# Patient Record
Sex: MALE | Race: WHITE | Employment: FULL TIME | URBAN - NONMETROPOLITAN AREA
[De-identification: names, ages, dates, MRNs, and addresses within clinical notes are randomized per-mention and may not be internally consistent; named-entity substitution may affect disease eponyms.]

---

## 2019-01-17 LAB
CHOLESTEROL, TOTAL: NORMAL
CHOLESTEROL/HDL RATIO: NORMAL
HDLC SERPL-MCNC: NORMAL MG/DL
LDL CHOLESTEROL CALCULATED: NORMAL
TRIGL SERPL-MCNC: NORMAL MG/DL
VLDLC SERPL CALC-MCNC: NORMAL MG/DL

## 2019-12-18 RX ORDER — TRIAMCINOLONE ACETONIDE 1 MG/G
CREAM TOPICAL DAILY
COMMUNITY
End: 2020-01-17

## 2020-01-17 ENCOUNTER — OFFICE VISIT (OUTPATIENT)
Dept: FAMILY MEDICINE CLINIC | Age: 65
End: 2020-01-17
Payer: COMMERCIAL

## 2020-01-17 ENCOUNTER — HOSPITAL ENCOUNTER (OUTPATIENT)
Age: 65
Discharge: HOME | End: 2020-01-19
Payer: COMMERCIAL

## 2020-01-17 VITALS
SYSTOLIC BLOOD PRESSURE: 138 MMHG | TEMPERATURE: 97.5 F | OXYGEN SATURATION: 99 % | DIASTOLIC BLOOD PRESSURE: 88 MMHG | HEART RATE: 63 BPM | WEIGHT: 206.2 LBS | BODY MASS INDEX: 26.46 KG/M2 | HEIGHT: 74 IN

## 2020-01-17 LAB
ALBUMIN SERPL-MCNC: 4.5 G/DL (ref 3.5–5.2)
ALP BLD-CCNC: 39 U/L (ref 40–129)
ALT SERPL-CCNC: 14 U/L (ref 0–40)
ANION GAP SERPL CALCULATED.3IONS-SCNC: 15 MMOL/L (ref 7–16)
AST SERPL-CCNC: 18 U/L (ref 0–39)
BASOPHILS ABSOLUTE: 0.03 E9/L (ref 0–0.2)
BASOPHILS RELATIVE PERCENT: 0.5 % (ref 0–2)
BILIRUB SERPL-MCNC: 0.4 MG/DL (ref 0–1.2)
BUN BLDV-MCNC: 12 MG/DL (ref 8–23)
CALCIUM SERPL-MCNC: 9.4 MG/DL (ref 8.6–10.2)
CHLORIDE BLD-SCNC: 103 MMOL/L (ref 98–107)
CHOLESTEROL, TOTAL: 171 MG/DL (ref 0–199)
CO2: 24 MMOL/L (ref 22–29)
CREAT SERPL-MCNC: 1.3 MG/DL (ref 0.7–1.2)
EOSINOPHILS ABSOLUTE: 0.27 E9/L (ref 0.05–0.5)
EOSINOPHILS RELATIVE PERCENT: 4.5 % (ref 0–6)
GFR AFRICAN AMERICAN: >60
GFR NON-AFRICAN AMERICAN: 56 ML/MIN/1.73
GLUCOSE BLD-MCNC: 94 MG/DL (ref 74–99)
HCT VFR BLD CALC: 46.2 % (ref 37–54)
HDLC SERPL-MCNC: 71 MG/DL
HEMOGLOBIN: 14.6 G/DL (ref 12.5–16.5)
IMMATURE GRANULOCYTES #: 0.01 E9/L
IMMATURE GRANULOCYTES %: 0.2 % (ref 0–5)
LDL CHOLESTEROL CALCULATED: 83 MG/DL (ref 0–99)
LYMPHOCYTES ABSOLUTE: 1.61 E9/L (ref 1.5–4)
LYMPHOCYTES RELATIVE PERCENT: 26.7 % (ref 20–42)
MCH RBC QN AUTO: 28.9 PG (ref 26–35)
MCHC RBC AUTO-ENTMCNC: 31.6 % (ref 32–34.5)
MCV RBC AUTO: 91.5 FL (ref 80–99.9)
MONOCYTES ABSOLUTE: 0.53 E9/L (ref 0.1–0.95)
MONOCYTES RELATIVE PERCENT: 8.8 % (ref 2–12)
NEUTROPHILS ABSOLUTE: 3.59 E9/L (ref 1.8–7.3)
NEUTROPHILS RELATIVE PERCENT: 59.3 % (ref 43–80)
PDW BLD-RTO: 12.8 FL (ref 11.5–15)
PLATELET # BLD: 208 E9/L (ref 130–450)
PMV BLD AUTO: 10.7 FL (ref 7–12)
POTASSIUM SERPL-SCNC: 4.9 MMOL/L (ref 3.5–5)
PROSTATE SPECIFIC ANTIGEN: 0.73 NG/ML (ref 0–4)
RBC # BLD: 5.05 E12/L (ref 3.8–5.8)
SODIUM BLD-SCNC: 142 MMOL/L (ref 132–146)
TOTAL PROTEIN: 7.1 G/DL (ref 6.4–8.3)
TRIGL SERPL-MCNC: 84 MG/DL (ref 0–149)
TSH SERPL DL<=0.05 MIU/L-ACNC: 5.42 UIU/ML (ref 0.27–4.2)
VLDLC SERPL CALC-MCNC: 17 MG/DL
WBC # BLD: 6 E9/L (ref 4.5–11.5)

## 2020-01-17 PROCEDURE — 80061 LIPID PANEL: CPT

## 2020-01-17 PROCEDURE — 85025 COMPLETE CBC W/AUTO DIFF WBC: CPT

## 2020-01-17 PROCEDURE — 93000 ELECTROCARDIOGRAM COMPLETE: CPT | Performed by: FAMILY MEDICINE

## 2020-01-17 PROCEDURE — G8484 FLU IMMUNIZE NO ADMIN: HCPCS | Performed by: FAMILY MEDICINE

## 2020-01-17 PROCEDURE — 84443 ASSAY THYROID STIM HORMONE: CPT

## 2020-01-17 PROCEDURE — 99396 PREV VISIT EST AGE 40-64: CPT | Performed by: FAMILY MEDICINE

## 2020-01-17 PROCEDURE — 80053 COMPREHEN METABOLIC PANEL: CPT

## 2020-01-17 PROCEDURE — G0103 PSA SCREENING: HCPCS

## 2020-01-17 PROCEDURE — 36415 COLL VENOUS BLD VENIPUNCTURE: CPT

## 2020-01-17 RX ORDER — AMLODIPINE BESYLATE 5 MG/1
5 TABLET ORAL DAILY
Qty: 90 TABLET | Refills: 1 | Status: SHIPPED
Start: 2020-01-17 | End: 2021-04-13

## 2020-01-17 RX ORDER — CLOTRIMAZOLE AND BETAMETHASONE DIPROPIONATE 10; .64 MG/G; MG/G
CREAM TOPICAL
Qty: 1 TUBE | Refills: 2 | Status: SHIPPED
Start: 2020-01-17 | End: 2021-04-13

## 2020-01-17 SDOH — HEALTH STABILITY: MENTAL HEALTH: HOW MANY STANDARD DRINKS CONTAINING ALCOHOL DO YOU HAVE ON A TYPICAL DAY?: 1 OR 2

## 2020-01-17 SDOH — HEALTH STABILITY: MENTAL HEALTH: HOW OFTEN DO YOU HAVE A DRINK CONTAINING ALCOHOL?: 2-3 TIMES A WEEK

## 2020-01-17 ASSESSMENT — ENCOUNTER SYMPTOMS
DIARRHEA: 0
TROUBLE SWALLOWING: 0
ABDOMINAL PAIN: 0
BLOOD IN STOOL: 0
SHORTNESS OF BREATH: 0
PHOTOPHOBIA: 0
EYE REDNESS: 0
COUGH: 0
SORE THROAT: 0
BACK PAIN: 0
CONSTIPATION: 0
WHEEZING: 0
VOMITING: 0
SINUS PAIN: 0

## 2020-01-17 ASSESSMENT — PATIENT HEALTH QUESTIONNAIRE - PHQ9
SUM OF ALL RESPONSES TO PHQ QUESTIONS 1-9: 0
SUM OF ALL RESPONSES TO PHQ QUESTIONS 1-9: 0
2. FEELING DOWN, DEPRESSED OR HOPELESS: 0
1. LITTLE INTEREST OR PLEASURE IN DOING THINGS: 0
SUM OF ALL RESPONSES TO PHQ9 QUESTIONS 1 & 2: 0

## 2020-01-17 NOTE — PROGRESS NOTES
(NORVASC) 5 MG tablet; Take 1 tablet by mouth daily  -     CBC Auto Differential; Future  -     Comprehensive Metabolic Panel; Future  -     EKG 12 Lead; Future  -     EKG 12 Lead. Changing jobs. BP borderline often high when checked. Will start low dose amlodipine    Screening for prostate cancer  -     PSA SCREENING; Future. Vague pain in area of prostrate intermittently. If PSA up would consider prostatitis    Screen for colon cancer  -     POCT Fit Test; Future  Will consider colonoscopy    Encounter for wellness examination in adult  -     CBC Auto Differential; Future  -     Comprehensive Metabolic Panel; Future  -     Lipid Panel; Future  -     TSH without Reflex; Future  -     PSA SCREENING; Future  -     POCT Fit Test; Future  -     EKG 12 Lead; Future  -     EKG 12 Lead    Overall healthy. Smokes occasional cigar and cannibis     Return in about 1 year (around 1/17/2021). Electronically signed by Luis Stoddard MD on 1/17/20 at 8:15 AM    I have personally reviewed and updated the chief complaint, HPI, Past Medical, Family and Social History, as well as the above Review of Systems.

## 2020-02-11 ENCOUNTER — TELEPHONE (OUTPATIENT)
Dept: FAMILY MEDICINE CLINIC | Age: 65
End: 2020-02-11

## 2020-12-21 ENCOUNTER — HOSPITAL ENCOUNTER (OUTPATIENT)
Dept: CARDIAC CATH/INVASIVE PROCEDURES | Age: 65
Discharge: HOME | End: 2020-12-21
Payer: COMMERCIAL

## 2020-12-21 ENCOUNTER — HOSPITAL ENCOUNTER (OUTPATIENT)
Age: 65
Discharge: HOME | End: 2020-12-21
Payer: COMMERCIAL

## 2020-12-21 VITALS
DIASTOLIC BLOOD PRESSURE: 74 MMHG | WEIGHT: 193 LBS | HEART RATE: 78 BPM | TEMPERATURE: 96.9 F | SYSTOLIC BLOOD PRESSURE: 102 MMHG | BODY MASS INDEX: 24.77 KG/M2 | RESPIRATION RATE: 18 BRPM | HEIGHT: 74 IN

## 2020-12-21 LAB
ABO/RH: NORMAL
ANTIBODY SCREEN: NORMAL

## 2020-12-21 PROCEDURE — 36415 COLL VENOUS BLD VENIPUNCTURE: CPT

## 2020-12-21 PROCEDURE — 2709999900 HC NON-CHARGEABLE SUPPLY

## 2020-12-21 PROCEDURE — 93458 L HRT ARTERY/VENTRICLE ANGIO: CPT

## 2020-12-21 PROCEDURE — C1769 GUIDE WIRE: HCPCS

## 2020-12-21 PROCEDURE — 86901 BLOOD TYPING SEROLOGIC RH(D): CPT

## 2020-12-21 PROCEDURE — C1894 INTRO/SHEATH, NON-LASER: HCPCS

## 2020-12-21 PROCEDURE — 6360000002 HC RX W HCPCS

## 2020-12-21 PROCEDURE — 2500000003 HC RX 250 WO HCPCS

## 2020-12-21 PROCEDURE — 93458 L HRT ARTERY/VENTRICLE ANGIO: CPT | Performed by: INTERNAL MEDICINE

## 2020-12-21 PROCEDURE — 86900 BLOOD TYPING SEROLOGIC ABO: CPT

## 2020-12-21 PROCEDURE — C1887 CATHETER, GUIDING: HCPCS

## 2020-12-21 PROCEDURE — 86850 RBC ANTIBODY SCREEN: CPT

## 2020-12-21 RX ORDER — ACETAMINOPHEN 325 MG/1
650 TABLET ORAL EVERY 4 HOURS PRN
Status: CANCELLED | OUTPATIENT
Start: 2020-12-21

## 2020-12-21 RX ORDER — SODIUM CHLORIDE 9 MG/ML
INJECTION, SOLUTION INTRAVENOUS ONCE
Status: DISCONTINUED | OUTPATIENT
Start: 2020-12-21 | End: 2020-12-22 | Stop reason: HOSPADM

## 2020-12-21 RX ORDER — SODIUM CHLORIDE 0.9 % (FLUSH) 0.9 %
10 SYRINGE (ML) INJECTION PRN
Status: DISCONTINUED | OUTPATIENT
Start: 2020-12-21 | End: 2020-12-22 | Stop reason: HOSPADM

## 2020-12-21 RX ORDER — SODIUM CHLORIDE 0.9 % (FLUSH) 0.9 %
10 SYRINGE (ML) INJECTION PRN
Status: CANCELLED | OUTPATIENT
Start: 2020-12-21

## 2020-12-21 RX ORDER — ACETAMINOPHEN 325 MG/1
650 TABLET ORAL EVERY 4 HOURS PRN
Status: DISCONTINUED | OUTPATIENT
Start: 2020-12-21 | End: 2020-12-22 | Stop reason: HOSPADM

## 2020-12-21 RX ORDER — SODIUM CHLORIDE 0.9 % (FLUSH) 0.9 %
10 SYRINGE (ML) INJECTION EVERY 12 HOURS SCHEDULED
Status: DISCONTINUED | OUTPATIENT
Start: 2020-12-21 | End: 2020-12-22 | Stop reason: HOSPADM

## 2020-12-21 RX ORDER — SODIUM CHLORIDE 0.9 % (FLUSH) 0.9 %
10 SYRINGE (ML) INJECTION EVERY 12 HOURS SCHEDULED
Status: CANCELLED | OUTPATIENT
Start: 2020-12-21

## 2020-12-21 NOTE — PROGRESS NOTES
Patient being transferred from [facility] for heart catheterization in setting of acute systolic heart failure with LVEF 10-15%, 3+ MR.   Currently on guideline directed medical therapy including beta-blocker, ACE inhibitor, diuretic, Aldactone and a LifeVest.  If no significant CAD, okay for discharge and office will arrange follow-up appointment

## 2020-12-21 NOTE — PROCEDURES
[address]                  [address] [place],  [facility]                            CARDIAC CATHETERIZATION    PATIENT NAME: Carlita Stern                  :        1955  MED REC NO:   [id number]                            ROOM:  ACCOUNT NO:   [de-identified]                           ADMIT DATE: 2020  PROVIDER:     Yesenia Carlson MD    DATE OF PROCEDURE:  2020    LEFT HEART CATHETERIZATION    REFERRING PROVIDER:  Sharon Shea MD    INDICATION:  Very severe systolic dysfunction with an ejection fraction  of 10-15%, rule out significant CAD. PROCEDURE:  [facility]  undergo left heart catheterization. He was brought to the cardiac cath  lab in his usual fasting state. Under sterile condition and under local  anesthetic and using conscious sedation, a 6-Trinidadian Terumo Slender  sheath was inserted into the right radial artery. The patient received  5000 of intravenous heparin. He also received 200 mcg of intraarterial  nitroglycerin via the right radial sheath. Then a 5-Trinidadian TIG  diagnostic catheter was advanced to the ascending aorta without  difficulty. The left main coronary artery was engaged and a coronary  angiogram was done. Then the right coronary artery was engaged and a  coronary angiogram was done. This catheter was exchanged over a  guidewire to a 5-Trinidadian pigtail, which was advanced into the left  ventricle without difficulty. The left ventricular end-diastolic  pressure was measured. No left ventriculogram was done due to known  ejection fraction by echocardiogram.  There was no significant gradient  across the aortic valve. At the end of the procedure, pigtail was  pulled out. The Terumo Slender sheath was pulled out and a Vasc Band  was applied over the right radial artery with good hemostasis.   The patient tolerated procedure very well and no complications were noted. No significant blood loss occurred during the procedure. FINDINGS:    HEMODYNAMIC RESULTS:  1. Aortic pressure 99/69 mmHg. 2.  Left ventricular end-diastolic pressure 14 mmHg. CORONARY ANATOMY:  LEFT MAIN:  It is a long and large artery with 20% discrete distal left  main stenosis. LAD:  It is a large artery wrapping around the apex and giving rise to  three diagonal branches and two septal perforators. There was 30%  discrete mid LAD stenosis at the takeoff of the second diagonal branch. LEFT CIRCUMFLEX:  It is a large artery giving rise to three obtuse  marginal branches. The first and third obtuse marginal branches were  small. The second obtuse marginal branch was large. There was 30%  ostial left circumflex stenosis. RIGHT CORONARY ARTERY:  It is a large dominant artery giving rise to a  conus branch, two right ventricular marginal branches, a bifurcating PDA  and a PLV branch. 10% irregularities were noted in the mid RCA. IMPRESSION:  1. Mild nonobstructive CAD. 2.  LVEDP 14 mmHg. RECOMMENDATIONS:  1. Aggressive medical therapy. 2.  Guideline-directed medical therapy. PROCEDURE START TIME:  1450 hours. PROCEDURE END TIME:  1507 hours. CONTRAST VOLUME:  35 mL of Optiray. FLUOROSCOPY TIME:  1.7 minutes. CONSCIOUS SEDATION:  1 mg of intravenous Versed and 50 mcg of  intravenous fentanyl.         Nelson Brandon MD    D: 12/21/2020 15:20:48       T: 12/21/2020 15:24:56     [id number]  Job#: [id number]     Doc#: [id number]    CC:

## 2020-12-31 ENCOUNTER — TELEPHONE (OUTPATIENT)
Dept: ADMINISTRATIVE | Age: 65
End: 2020-12-31

## 2020-12-31 NOTE — TELEPHONE ENCOUNTER
Tima Evans was D/Cd from [facility] on 12/20, had cardiac testing done in [facility] on 12/21, and needs a hospital follow up. The next available hosp f/u is 01/13. He said that he is following up with his cardiologist on 1/6. Please advise when and where to add pt to schedule.   Thanks

## 2021-01-11 ENCOUNTER — OFFICE VISIT (OUTPATIENT)
Dept: FAMILY MEDICINE CLINIC | Age: 66
End: 2021-01-11
Payer: COMMERCIAL

## 2021-01-11 VITALS
HEART RATE: 63 BPM | HEIGHT: 74 IN | SYSTOLIC BLOOD PRESSURE: 126 MMHG | WEIGHT: 201 LBS | DIASTOLIC BLOOD PRESSURE: 72 MMHG | OXYGEN SATURATION: 98 % | BODY MASS INDEX: 25.8 KG/M2

## 2021-01-11 DIAGNOSIS — I42.0 DILATED CARDIOMYOPATHY (HCC): ICD-10-CM

## 2021-01-11 DIAGNOSIS — I34.0 NONRHEUMATIC MITRAL VALVE REGURGITATION: ICD-10-CM

## 2021-01-11 DIAGNOSIS — I50.32 CHRONIC CONGESTIVE HEART FAILURE WITH LEFT VENTRICULAR DIASTOLIC DYSFUNCTION (HCC): Primary | ICD-10-CM

## 2021-01-11 PROBLEM — I50.30 CONGESTIVE HEART FAILURE WITH LEFT VENTRICULAR DIASTOLIC DYSFUNCTION (HCC): Status: ACTIVE | Noted: 2021-01-11

## 2021-01-11 LAB
CHP ED QC CHECK: NORMAL
GLUCOSE BLD-MCNC: 92 MG/DL
HBA1C MFR BLD: 5.3 %

## 2021-01-11 PROCEDURE — 4004F PT TOBACCO SCREEN RCVD TLK: CPT | Performed by: FAMILY MEDICINE

## 2021-01-11 PROCEDURE — G8484 FLU IMMUNIZE NO ADMIN: HCPCS | Performed by: FAMILY MEDICINE

## 2021-01-11 PROCEDURE — G8419 CALC BMI OUT NRM PARAM NOF/U: HCPCS | Performed by: FAMILY MEDICINE

## 2021-01-11 PROCEDURE — G8427 DOCREV CUR MEDS BY ELIG CLIN: HCPCS | Performed by: FAMILY MEDICINE

## 2021-01-11 PROCEDURE — 82962 GLUCOSE BLOOD TEST: CPT | Performed by: FAMILY MEDICINE

## 2021-01-11 PROCEDURE — 1111F DSCHRG MED/CURRENT MED MERGE: CPT | Performed by: FAMILY MEDICINE

## 2021-01-11 PROCEDURE — 99214 OFFICE O/P EST MOD 30 MIN: CPT | Performed by: FAMILY MEDICINE

## 2021-01-11 PROCEDURE — 83036 HEMOGLOBIN GLYCOSYLATED A1C: CPT | Performed by: FAMILY MEDICINE

## 2021-01-11 PROCEDURE — 3017F COLORECTAL CA SCREEN DOC REV: CPT | Performed by: FAMILY MEDICINE

## 2021-01-11 RX ORDER — SPIRONOLACTONE 25 MG/1
25 TABLET ORAL DAILY
COMMUNITY
Start: 2021-01-02

## 2021-01-11 RX ORDER — CARVEDILOL 12.5 MG/1
TABLET ORAL
COMMUNITY
Start: 2020-12-26

## 2021-01-11 RX ORDER — BUMETANIDE 1 MG/1
1 TABLET ORAL DAILY
COMMUNITY

## 2021-01-11 ASSESSMENT — ENCOUNTER SYMPTOMS
CONSTIPATION: 0
WHEEZING: 0
BLOOD IN STOOL: 0
PHOTOPHOBIA: 0
ABDOMINAL PAIN: 0
SHORTNESS OF BREATH: 1
DIARRHEA: 0
BACK PAIN: 0
VOMITING: 0
COUGH: 0
SORE THROAT: 0
EYE REDNESS: 0
SINUS PAIN: 0
TROUBLE SWALLOWING: 0

## 2021-01-11 NOTE — PROGRESS NOTES
OFFICE NOTE    1/11/21  Name: Brett Mccormick  JYW:[birth year]   Sex:male   Age:65 y.o. SUBJECTIVE  Chief Complaint   Patient presents with    Follow-Up from Hospital       HPI  Transition to care. Was admitted to hospital in congestive heart failure. Full records not in but had significant pleural effusion and EF 15-20% on ECHO. Was sent home with f/u scheduled with Dr. Aries Macias and with defibrillator vest. At Cardiology f/u was advised to have ECHO done in couple of mos and f/u in May. Was advised to wear vest until seen. No instructions on return to work. Did not take meds when left hospital for 5 days as said he had no prescription or written instructions. Brought in [place] samples. And told he was told to stop Enalopril and replace with Entresto but hadn't filled or started the latter. Denies chest pain, syncope. Says able to sleep in bed, no defibrillator discharges. 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 Review of Systems   Constitutional: Positive for fatigue. Negative for appetite change, fever and unexpected weight change. HENT: Negative for congestion, ear pain, hearing loss, sinus pain, sore throat and trouble swallowing. Eyes: Negative for photophobia, redness and visual disturbance. Respiratory: Positive for shortness of breath. Negative for cough and wheezing. ORDONEZ has improved substantially   Cardiovascular: Positive for palpitations. Negative for chest pain and leg swelling. Dependent edema has resolved   Gastrointestinal: Negative for abdominal pain, blood in stool, constipation, diarrhea and vomiting. Endocrine: Negative for cold intolerance, polydipsia and polyuria. Genitourinary: Negative for difficulty urinating, genital sores, hematuria and urgency. Musculoskeletal: Negative for arthralgias, back pain and joint swelling. Allergic/Immunologic: Negative for food allergies. Neurological: Negative for dizziness, tremors, seizures, syncope, numbness and headaches. Hematological: Negative for adenopathy. Does not bruise/bleed easily. Psychiatric/Behavioral: Negative for agitation, dysphoric mood, hallucinations and sleep disturbance. All other systems reviewed and are negative. Current Outpatient Medications:     carvedilol (COREG) 12.5 MG tablet, take 1 tablet by mouth twice a day, Disp: , Rfl:     spironolactone (ALDACTONE) 25 MG tablet, , Disp: , Rfl:     bumetanide (BUMEX) 1 MG tablet, Take 1 mg by mouth daily, Disp: , Rfl:     amLODIPine (NORVASC) 5 MG tablet, Take 1 tablet by mouth daily, Disp: 90 tablet, Rfl: 1    clotrimazole-betamethasone (LOTRISONE) 1-0.05 % cream, Apply topically 2 times daily. (Patient not taking: Reported on 1/11/2021), Disp: 1 Tube, Rfl: 2  No Known Allergies    Past Medical History:   Diagnosis Date    Hypertension      Past Surgical History:   Procedure Laterality Date    CARDIAC CATHETERIZATION  12/21/2020    coronaries with lv pressures     No family history on file. Social History     Tobacco History     Smoking Status  Current Some Day Smoker Smoking Frequency  0 packs/day for 42 years (0 pk yrs) Smoking Tobacco Type  Cigars    Smokeless Tobacco Use  Never Used          Alcohol History     Alcohol Use Status  Yes          Drug Use     Drug Use Status  Never          Sexual Activity     Sexually Active  Not Currently Partners  Female                OBJECTIVE  Vitals:    01/11/21 1250   BP: 126/72   Pulse: 63   SpO2: 98%   Weight: 201 lb (91.2 kg)   Height: 6' 2\" (1.88 m)        Body mass index is 25.81 kg/m². Orders Placed This Encounter   Procedures    XR CHEST (2 VW)     Standing Status:   Future     Number of Occurrences:   1     Standing Expiration Date:   1/11/2022    POCT glycosylated hemoglobin (Hb A1C)    POCT Glucose        EXAM   Physical Exam  Vitals signs and nursing note reviewed. Constitutional:       Appearance: Normal appearance. He is well-developed and normal weight.    HENT: Diagnoses and all orders for this visit:    Chronic congestive heart failure with left ventricular diastolic dysfunction (HCC)  -     XR CHEST (2 VW); Future  -     POCT glycosylated hemoglobin (Hb A1C)  -     POCT Glucose  Hgb a1C and glucose both normal. Does not need Farxiga  Dilated cardiomyopathy ([address]  -     POCT glycosylated hemoglobin (Hb A1C)  -     POCT Glucose  Advised us cath was normal. Filled out paperwork to get OV notes and cath report from Dr. Nuvia Tran office    Nonrheumatic mitral valve regurgitation  -     POCT glycosylated hemoglobin (Hb A1C)  -     POCT Glucose    Agree f/u ECHO on meds indicated. Would like to see OV notes to see if really supposed to wear vest and not f/u until May. Would not drive truck at this time until status clarified. Would resume Enalopril until [facility] [place] started    Return in 1 mos  No follow-ups on file.     Electronically signed by Humberto Nolasco MD on 1/11/21 at 1:15 PM EST

## 2021-03-01 LAB
LEFT VENTRICULAR EJECTION FRACTION HIGH VALUE: 15 %
LV EF: 10 %

## 2021-04-13 ENCOUNTER — OFFICE VISIT (OUTPATIENT)
Dept: NON INVASIVE DIAGNOSTICS | Age: 66
End: 2021-04-13
Payer: COMMERCIAL

## 2021-04-13 VITALS
SYSTOLIC BLOOD PRESSURE: 112 MMHG | BODY MASS INDEX: 25.67 KG/M2 | WEIGHT: 200 LBS | RESPIRATION RATE: 16 BRPM | HEART RATE: 53 BPM | DIASTOLIC BLOOD PRESSURE: 78 MMHG | HEIGHT: 74 IN

## 2021-04-13 DIAGNOSIS — I48.91 ATRIAL FIBRILLATION, UNSPECIFIED TYPE (HCC): Primary | ICD-10-CM

## 2021-04-13 PROCEDURE — 93000 ELECTROCARDIOGRAM COMPLETE: CPT | Performed by: SPECIALIST

## 2021-04-13 PROCEDURE — 99205 OFFICE O/P NEW HI 60 MIN: CPT | Performed by: SPECIALIST

## 2021-04-13 RX ORDER — SACUBITRIL AND VALSARTAN 24; 26 MG/1; MG/1
TABLET, FILM COATED ORAL
COMMUNITY
Start: 2021-03-25

## 2021-04-13 NOTE — PROGRESS NOTES
Sarah Russo paid his first visit to the [facility]) Cardiac Electrophysiology office on 4/13/21. SYNOPSIS  1. Poor metabolic health incurring multiple comorbid conditions. 2. Heart failure with reduced ejection fraction: initial presentation as decompensated in late 2020. By report, left ventricular systolic function was severely diminished, mitral regurgitation significant (not severe), and coronary angiography was unremarkable. Maximally tolerated medicinal therapy was introduced, and he was fitted with a wearable cardioverter defibrillator system (below) as a \"primary prevention\" gesture. A repeat echocardiogram in March demonstrated persistently/severely diminished left ventricular systolic function. 3. Sinus node somnolence. 4. Atrioventricular conduction is normal.  5. Intraventricular conduction is normal.  6. Wearable cardioverter-defibrillator: normal interim function. MY PLAN  1. Implant dual-chamber cardioverter-defibrillator system as a \"primary prevention\" gesture to replace the wearable cardioverter-defibrillator. I provided him with educational materials pertinent to his condition and to ICD therapy. Included among the latter is a shared decision making document, which he will review prior to making a final decision. I will keep you posted. Yari Villa MD, [facility]      A total of 60 minutes were spent in preparation for the clinic visit, reviewing records/tests, counseling/education of the patient, ordering medications/tests/procedures, coordinating care, and documenting clinical information in the EHR.

## 2021-04-13 NOTE — LETTER
Manuel Metz paid his first visit to the [facility]) Cardiac Electrophysiology office on 4/13/21. SYNOPSIS  1. Poor metabolic health incurring multiple comorbid conditions. 2. Heart failure with reduced ejection fraction: initial presentation as decompensated in late 2020. By report, left ventricular systolic function was severely diminished, mitral regurgitation significant (not severe), and coronary angiography was unremarkable. Maximally tolerated medicinal therapy was introduced, and he was fitted with a wearable cardioverter defibrillator system (below) as a \"primary prevention\" gesture. A repeat echocardiogram in March demonstrated persistently/severely diminished left ventricular systolic function. 3. Sinus node somnolence. 4. Atrioventricular conduction is normal.  5. Intraventricular conduction is normal.  6. Wearable cardioverter-defibrillator: normal interim function. MY PLAN  1. Implant dual-chamber cardioverter-defibrillator system as a \"primary prevention\" gesture to replace the wearable cardioverter-defibrillator. I provided him with educational materials pertinent to his condition and to ICD therapy. Included among the latter is a shared decision making document, which he will review prior to making a final decision. I will keep you posted.      Philip Dance, MD, [facility]

## 2021-04-16 ENCOUNTER — TELEPHONE (OUTPATIENT)
Dept: NON INVASIVE DIAGNOSTICS | Age: 66
End: 2021-04-16

## 2021-04-16 DIAGNOSIS — Z01.818 PRE-OP TESTING: Primary | ICD-10-CM

## 2021-04-26 ENCOUNTER — HOSPITAL ENCOUNTER (OUTPATIENT)
Age: 66
Discharge: HOME | End: 2021-04-28
Payer: COMMERCIAL

## 2021-04-26 DIAGNOSIS — Z01.818 PRE-OP TESTING: ICD-10-CM

## 2021-04-26 PROCEDURE — U0003 INFECTIOUS AGENT DETECTION BY NUCLEIC ACID (DNA OR RNA); SEVERE ACUTE RESPIRATORY SYNDROME CORONAVIRUS 2 (SARS-COV-2) (CORONAVIRUS DISEASE [COVID-19]), AMPLIFIED PROBE TECHNIQUE, MAKING USE OF HIGH THROUGHPUT TECHNOLOGIES AS DESCRIBED BY CMS-2020-01-R: HCPCS

## 2021-04-26 PROCEDURE — U0005 INFEC AGEN DETEC AMPLI PROBE: HCPCS

## 2021-04-27 LAB
SARS-COV-2: NOT DETECTED
SOURCE: NORMAL

## 2021-04-29 ENCOUNTER — TELEPHONE (OUTPATIENT)
Dept: CARDIAC CATH/INVASIVE PROCEDURES | Age: 66
End: 2021-04-29

## 2021-04-29 NOTE — TELEPHONE ENCOUNTER
Reminded patient of scheduled procedure on 4/30/21. Instructions given and COVID questionnaire completed.

## 2021-04-30 ENCOUNTER — TELEPHONE (OUTPATIENT)
Dept: NON INVASIVE DIAGNOSTICS | Age: 66
End: 2021-04-30

## 2021-04-30 ENCOUNTER — HOSPITAL ENCOUNTER (OUTPATIENT)
Dept: GENERAL RADIOLOGY | Age: 66
Discharge: HOME | End: 2021-05-02
Payer: COMMERCIAL

## 2021-04-30 ENCOUNTER — ANESTHESIA (OUTPATIENT)
Dept: CARDIAC CATH/INVASIVE PROCEDURES | Age: 66
End: 2021-04-30

## 2021-04-30 ENCOUNTER — ANESTHESIA EVENT (OUTPATIENT)
Dept: CARDIAC CATH/INVASIVE PROCEDURES | Age: 66
End: 2021-04-30

## 2021-04-30 ENCOUNTER — HOSPITAL ENCOUNTER (OUTPATIENT)
Dept: CARDIAC CATH/INVASIVE PROCEDURES | Age: 66
Discharge: HOME | End: 2021-04-30
Payer: COMMERCIAL

## 2021-04-30 VITALS
OXYGEN SATURATION: 99 % | BODY MASS INDEX: 25.03 KG/M2 | SYSTOLIC BLOOD PRESSURE: 119 MMHG | HEART RATE: 50 BPM | RESPIRATION RATE: 20 BRPM | DIASTOLIC BLOOD PRESSURE: 70 MMHG | HEIGHT: 74 IN | TEMPERATURE: 97.5 F | WEIGHT: 195 LBS

## 2021-04-30 VITALS — OXYGEN SATURATION: 98 % | SYSTOLIC BLOOD PRESSURE: 105 MMHG | DIASTOLIC BLOOD PRESSURE: 66 MMHG

## 2021-04-30 LAB
ANION GAP SERPL CALCULATED.3IONS-SCNC: 7 MMOL/L (ref 7–16)
BASOPHILS ABSOLUTE: 0.05 E9/L (ref 0–0.2)
BASOPHILS RELATIVE PERCENT: 0.5 % (ref 0–2)
BUN BLDV-MCNC: 17 MG/DL (ref 6–23)
CALCIUM SERPL-MCNC: 9 MG/DL (ref 8.6–10.2)
CHLORIDE BLD-SCNC: 103 MMOL/L (ref 98–107)
CO2: 30 MMOL/L (ref 22–29)
CREAT SERPL-MCNC: 1.2 MG/DL (ref 0.7–1.2)
EKG ATRIAL RATE: 57 BPM
EKG ATRIAL RATE: 78 BPM
EKG P AXIS: -9 DEGREES
EKG P-R INTERVAL: 146 MS
EKG P-R INTERVAL: 202 MS
EKG Q-T INTERVAL: 412 MS
EKG Q-T INTERVAL: 422 MS
EKG QRS DURATION: 88 MS
EKG QRS DURATION: 90 MS
EKG QTC CALCULATION (BAZETT): 410 MS
EKG QTC CALCULATION (BAZETT): 469 MS
EKG R AXIS: -2 DEGREES
EKG R AXIS: 12 DEGREES
EKG T AXIS: 118 DEGREES
EKG T AXIS: 138 DEGREES
EKG VENTRICULAR RATE: 57 BPM
EKG VENTRICULAR RATE: 78 BPM
EOSINOPHILS ABSOLUTE: 0.39 E9/L (ref 0.05–0.5)
EOSINOPHILS RELATIVE PERCENT: 4.2 % (ref 0–6)
GFR AFRICAN AMERICAN: >60
GFR NON-AFRICAN AMERICAN: >60 ML/MIN/1.73
GLUCOSE BLD-MCNC: 94 MG/DL (ref 74–99)
HCT VFR BLD CALC: 42.2 % (ref 37–54)
HEMOGLOBIN: 14.4 G/DL (ref 12.5–16.5)
IMMATURE GRANULOCYTES #: 0.04 E9/L
IMMATURE GRANULOCYTES %: 0.4 % (ref 0–5)
LYMPHOCYTES ABSOLUTE: 2.46 E9/L (ref 1.5–4)
LYMPHOCYTES RELATIVE PERCENT: 26.5 % (ref 20–42)
MAGNESIUM: 2.3 MG/DL (ref 1.6–2.6)
MCH RBC QN AUTO: 30.7 PG (ref 26–35)
MCHC RBC AUTO-ENTMCNC: 34.1 % (ref 32–34.5)
MCV RBC AUTO: 90 FL (ref 80–99.9)
MONOCYTES ABSOLUTE: 0.96 E9/L (ref 0.1–0.95)
MONOCYTES RELATIVE PERCENT: 10.3 % (ref 2–12)
NEUTROPHILS ABSOLUTE: 5.4 E9/L (ref 1.8–7.3)
NEUTROPHILS RELATIVE PERCENT: 58.1 % (ref 43–80)
PDW BLD-RTO: 12.2 FL (ref 11.5–15)
PLATELET # BLD: 237 E9/L (ref 130–450)
PMV BLD AUTO: 9.2 FL (ref 7–12)
POTASSIUM SERPL-SCNC: 4.2 MMOL/L (ref 3.5–5)
RBC # BLD: 4.69 E12/L (ref 3.8–5.8)
SODIUM BLD-SCNC: 140 MMOL/L (ref 132–146)
WBC # BLD: 9.3 E9/L (ref 4.5–11.5)

## 2021-04-30 PROCEDURE — C1894 INTRO/SHEATH, NON-LASER: HCPCS

## 2021-04-30 PROCEDURE — 2580000003 HC RX 258: Performed by: NURSE ANESTHETIST, CERTIFIED REGISTERED

## 2021-04-30 PROCEDURE — C1721 AICD, DUAL CHAMBER: HCPCS

## 2021-04-30 PROCEDURE — 71046 X-RAY EXAM CHEST 2 VIEWS: CPT

## 2021-04-30 PROCEDURE — 3700000001 HC ADD 15 MINUTES (ANESTHESIA)

## 2021-04-30 PROCEDURE — 85025 COMPLETE CBC W/AUTO DIFF WBC: CPT

## 2021-04-30 PROCEDURE — C1898 LEAD, PMKR, OTHER THAN TRANS: HCPCS

## 2021-04-30 PROCEDURE — 6360000002 HC RX W HCPCS: Performed by: NURSE ANESTHETIST, CERTIFIED REGISTERED

## 2021-04-30 PROCEDURE — 83735 ASSAY OF MAGNESIUM: CPT

## 2021-04-30 PROCEDURE — 3700000000 HC ANESTHESIA ATTENDED CARE

## 2021-04-30 PROCEDURE — 33249 INSJ/RPLCMT DEFIB W/LEAD(S): CPT | Performed by: SPECIALIST

## 2021-04-30 PROCEDURE — 6360000002 HC RX W HCPCS

## 2021-04-30 PROCEDURE — 80048 BASIC METABOLIC PNL TOTAL CA: CPT

## 2021-04-30 PROCEDURE — 2709999900 HC NON-CHARGEABLE SUPPLY

## 2021-04-30 PROCEDURE — 6370000000 HC RX 637 (ALT 250 FOR IP): Performed by: SPECIALIST

## 2021-04-30 PROCEDURE — 33249 INSJ/RPLCMT DEFIB W/LEAD(S): CPT

## 2021-04-30 PROCEDURE — 36415 COLL VENOUS BLD VENIPUNCTURE: CPT

## 2021-04-30 PROCEDURE — 93010 ELECTROCARDIOGRAM REPORT: CPT | Performed by: INTERNAL MEDICINE

## 2021-04-30 PROCEDURE — C1777 LEAD, AICD, ENDO SINGLE COIL: HCPCS

## 2021-04-30 PROCEDURE — 93005 ELECTROCARDIOGRAM TRACING: CPT | Performed by: SPECIALIST

## 2021-04-30 PROCEDURE — 2580000003 HC RX 258

## 2021-04-30 PROCEDURE — 2500000003 HC RX 250 WO HCPCS

## 2021-04-30 RX ORDER — PROPOFOL 10 MG/ML
INJECTION, EMULSION INTRAVENOUS PRN
Status: DISCONTINUED | OUTPATIENT
Start: 2021-04-30 | End: 2021-04-30 | Stop reason: SDUPTHER

## 2021-04-30 RX ORDER — FENTANYL CITRATE 50 UG/ML
INJECTION, SOLUTION INTRAMUSCULAR; INTRAVENOUS PRN
Status: DISCONTINUED | OUTPATIENT
Start: 2021-04-30 | End: 2021-04-30 | Stop reason: SDUPTHER

## 2021-04-30 RX ORDER — MIDAZOLAM HYDROCHLORIDE 1 MG/ML
INJECTION INTRAMUSCULAR; INTRAVENOUS PRN
Status: DISCONTINUED | OUTPATIENT
Start: 2021-04-30 | End: 2021-04-30 | Stop reason: SDUPTHER

## 2021-04-30 RX ORDER — CEFAZOLIN SODIUM 1 G/3ML
INJECTION, POWDER, FOR SOLUTION INTRAMUSCULAR; INTRAVENOUS PRN
Status: DISCONTINUED | OUTPATIENT
Start: 2021-04-30 | End: 2021-04-30 | Stop reason: SDUPTHER

## 2021-04-30 RX ORDER — ACETAMINOPHEN 500 MG
500 TABLET ORAL ONCE
Status: COMPLETED | OUTPATIENT
Start: 2021-04-30 | End: 2021-04-30

## 2021-04-30 RX ORDER — PROPOFOL 10 MG/ML
INJECTION, EMULSION INTRAVENOUS CONTINUOUS PRN
Status: DISCONTINUED | OUTPATIENT
Start: 2021-04-30 | End: 2021-04-30 | Stop reason: SDUPTHER

## 2021-04-30 RX ORDER — SODIUM CHLORIDE 9 MG/ML
INJECTION, SOLUTION INTRAVENOUS CONTINUOUS PRN
Status: DISCONTINUED | OUTPATIENT
Start: 2021-04-30 | End: 2021-04-30 | Stop reason: SDUPTHER

## 2021-04-30 RX ADMIN — FENTANYL CITRATE 50 MCG: 50 INJECTION, SOLUTION INTRAMUSCULAR; INTRAVENOUS at 07:50

## 2021-04-30 RX ADMIN — ACETAMINOPHEN 500 MG: 500 TABLET, COATED ORAL at 12:00

## 2021-04-30 RX ADMIN — SODIUM CHLORIDE: 9 INJECTION, SOLUTION INTRAVENOUS at 07:40

## 2021-04-30 RX ADMIN — FENTANYL CITRATE 50 MCG: 50 INJECTION, SOLUTION INTRAMUSCULAR; INTRAVENOUS at 07:45

## 2021-04-30 RX ADMIN — FENTANYL CITRATE 50 MCG: 50 INJECTION, SOLUTION INTRAMUSCULAR; INTRAVENOUS at 07:59

## 2021-04-30 RX ADMIN — PROPOFOL 20 MG: 10 INJECTION, EMULSION INTRAVENOUS at 07:50

## 2021-04-30 RX ADMIN — PROPOFOL 20 MCG/KG/MIN: 10 INJECTION, EMULSION INTRAVENOUS at 07:50

## 2021-04-30 RX ADMIN — MIDAZOLAM 2 MG: 1 INJECTION INTRAMUSCULAR; INTRAVENOUS at 07:45

## 2021-04-30 RX ADMIN — CEFAZOLIN 2000 MG: 1 INJECTION, POWDER, FOR SOLUTION INTRAMUSCULAR; INTRAVENOUS at 07:55

## 2021-04-30 ASSESSMENT — PULMONARY FUNCTION TESTS
PIF_VALUE: 13
PIF_VALUE: 12
PIF_VALUE: 15
PIF_VALUE: 12
PIF_VALUE: 1
PIF_VALUE: 13
PIF_VALUE: 1
PIF_VALUE: 13
PIF_VALUE: 12
PIF_VALUE: 13
PIF_VALUE: 3

## 2021-04-30 ASSESSMENT — LIFESTYLE VARIABLES: SMOKING_STATUS: 1

## 2021-04-30 ASSESSMENT — PAIN SCALES - GENERAL: PAINLEVEL_OUTOF10: 4

## 2021-04-30 NOTE — H&P
HISTORY AND PHYSICAL EXAMINATION    Kenton Ash    1955  Date of service: 4/30/2021    CHIEF COMPLAINT   Here for implantation of cardioverter-defibrillator system. SYNOPSIS OF RELEVANT MEDICAL HISTORY   1. Poor metabolic health incurring multiple comorbid conditions. 2. Heart failure with reduced ejection fraction: initial presentation as decompensated in late 2020. By report, left ventricular systolic function was severely diminished, mitral regurgitation significant (not severe), and coronary angiography was unremarkable. Maximally tolerated medicinal therapy was introduced, and he was fitted with a wearable cardioverter defibrillator system (below) as a \"primary prevention\" gesture. A repeat echocardiogram in March demonstrated persistently/severely diminished left ventricular systolic function. 3. Sinus node somnolence. 4. Atrioventricular conduction is normal.  5. Intraventricular conduction is normal.  6. Wearable cardioverter-defibrillator: normal interim function. Patient Active Problem List    Diagnosis Date Noted    Congestive heart failure with left ventricular diastolic dysfunction ([facility] [address] 01/11/2021    Dilated cardiomyopathy ([facility] 01/11/2021    Nonrheumatic mitral valve regurgitation 01/11/2021       Current Outpatient Medications   Medication Sig Dispense Refill    ENTRESTO 24-26 MG per tablet take 1 tablet by mouth twice a day      carvedilol (COREG) 12.5 MG tablet take 1 tablet by mouth twice a day      spironolactone (ALDACTONE) 25 MG tablet 25 mg daily Take 1 tablet daily      bumetanide (BUMEX) 1 MG tablet Take 1 mg by mouth daily       No current facility-administered medications for this encounter. ALLERGIES  No Known Allergies    REVIEW OF SYSTEMS   Constitutional: Negative for fever, activity change and appetite change. HENT: Negative for epistaxis, difficulty swallowing.    Eyes: Negative for blurred vision or double vision. Respiratory: Negative for cough, chest tightness, shortness of breath and wheezing. Cardiovascular: Negative for chest pain, palpitations and leg swelling. Gastrointestinal: Negative for abdominal pain, heartburn, or blood in stool. Genitourinary: Negative for hematuria, burning or frequency. Musculoskeletal: Negative for myalgias, stiffness, or swelling. Skin: Negative for rash, pain, or lumps. Neurological: Negative for syncope, seizures, or headaches. Psychiatric/Behavioral: Negative for confusion and agitation. The patient is not nervous/anxious. PHYSICAL EXAMINATION  Vitals:    04/30/21 0701   BP: 119/70   Pulse: 50   Resp: 20   Temp: 97.5 °F (36.4 °C)   SpO2: 99%   Weight: 195 lb (88.5 kg)   Height: 6' 2\" (1.88 m)     Constitutional: Oriented to person, place, and time. Well-developed and cooperative. Head: Normocephalic and atraumatic. Eyes: Conjunctivae are normal. Pupils are equal, round, and reactive to light. Neck: No hepatojugular reflux and no JVD present. Carotid bruit is not present. Cardiovascular: S1 normal, S2 normal and intact distal pulses. A regular rhythm present. PMI is not displaced. Pulmonary/Chest: Effort normal and breath sounds normal. No respiratory distress. Abdominal: Soft. Normal appearance and bowel sounds are normal.  No abdominal bruit and no pulsatile midline mass. There is no hepatomegaly. No tenderness. Musculoskeletal: Normal range of motion of all extremities, no muscle weakness. Neurological: Alert and oriented to person, place, and time. Gait normal.   Skin: Skin is warm and dry. No bruising, no ecchymosis and no rash noted. Extremity: No clubbing or cyanosis. No edema. Psychiatric: Normal mood and affect.  Thought content normal.    LABORATORY DATA  CBC:   Recent Labs     04/30/21  0635   WBC 9.3   HGB 14.4   HCT 42.2        BMP:   Recent Labs     04/30/21  0635      K 4.2   CL 103   CO2 30*   BUN 17   CREATININE 1.2   GLUCOSE 94   CALCIUM 9.0        ELECTROCARDIOGRAPHY   Sinus bradycardia. Normal conduction. IMAGING  NA    IMPRESSIONS   As per synopsis. He is medically ready for elective ICD implantation. PLAN   Implant ICD. Likely home today.       Zainab Watson MD, [facility]  Cardiac Electrophysiology  [phone]    7:12 AM  4/30/2021

## 2021-04-30 NOTE — DISCHARGE SUMMARY
The patient underwent uncomplicated implantation of a cardioverter-defibrillator system (ICD) on 4/30/21 AM as an outpatient. The postoperative course was uneventful. The postoperative CXR was acceptable, as was EKG and ICD functions. The ICD was \"paired\" to a home monitoring device in the presence of his brother, and this device was taken home with them. My post-operative care instructions are attached. The patient will be seen in my office in 1 week - the office will call him to arrange.     Radha Elliott MD, [facility]  Cardiac Electrophysiology  [phone]    4/30/21   9:11 AM EDT

## 2021-04-30 NOTE — ANESTHESIA PRE PROCEDURE
tobacco: Never Used   Substance Use Topics    Alcohol use: Not Currently     Frequency: 2-3 times a week     Drinks per session: 1 or 2     Binge frequency: Never                                Ready to quit: Not Answered  Counseling given: Not Answered      Vital Signs (Current):   Vitals:    04/30/21 0701   BP: 119/70   Pulse: 50   Resp: 20   Temp: 36.4 °C (97.5 °F)   SpO2: 99%   Weight: 195 lb (88.5 kg)   Height: 6' 2\" (1.88 m)                                              BP Readings from Last 3 Encounters:   04/30/21 119/70   04/13/21 112/78   01/11/21 126/72       NPO Status:  >8 hrs                                                                               BMI:   Wt Readings from Last 3 Encounters:   04/30/21 195 lb (88.5 kg)   04/13/21 200 lb (90.7 kg)   01/11/21 201 lb (91.2 kg)     Body mass index is 25.04 kg/m². CBC:   Lab Results   Component Value Date    WBC 9.3 04/30/2021    RBC 4.69 04/30/2021    HGB 14.4 04/30/2021    HCT 42.2 04/30/2021    MCV 90.0 04/30/2021    RDW 12.2 04/30/2021     04/30/2021       CMP:   Lab Results   Component Value Date     04/30/2021    K 4.2 04/30/2021     04/30/2021    CO2 30 04/30/2021    BUN 17 04/30/2021    CREATININE 1.2 04/30/2021    GFRAA >60 04/30/2021    AGRATIO 1.1 12/17/2020    LABGLOM >60 04/30/2021    GLUCOSE 94 04/30/2021    PROT 6.3 12/17/2020    CALCIUM 9.0 04/30/2021    BILITOT 0.8 12/17/2020    ALKPHOS 28 12/17/2020    AST 20 12/17/2020    ALT 16 12/17/2020       POC Tests: No results for input(s): POCGLU, POCNA, POCK, POCCL, POCBUN, POCHEMO, POCHCT in the last 72 hours.     Coags: No results found for: PROTIME, INR, APTT    HCG (If Applicable): No results found for: PREGTESTUR, PREGSERUM, HCG, HCGQUANT     ABGs: No results found for: PHART, PO2ART, RSX3XGR, JVM1ATV, BEART, N2IOJRGG     Type & Screen (If Applicable):  No results found for: LABABO, LABRH    Drug/Infectious Status (If Applicable):  No results found for: HIV, HEPCAB COVID-19 Screening (If Applicable):   Lab Results   Component Value Date    COVID19 Not Detected 04/26/2021           Anesthesia Evaluation  Patient summary reviewed and Nursing notes reviewed no history of anesthetic complications:   Airway: Mallampati: III  TM distance: >3 FB   Neck ROM: full  Mouth opening: > = 3 FB Dental:          Pulmonary:normal exam  breath sounds clear to auscultation  (+) current smoker          Patient did not smoke on day of surgery. Cardiovascular:    (+) hypertension:, valvular problems/murmurs (NRMR): MR, CHF:,         Rhythm: regular  Rate: normal                    Neuro/Psych:               GI/Hepatic/Renal:             Endo/Other:                     Abdominal:       Abdomen: soft. Vascular:                                        Anesthesia Plan      MAC     ASA 4       Induction: intravenous. Anesthetic plan and risks discussed with patient. Use of blood products discussed with patient whom. Plan discussed with attending.                   MARICRUZ Pino - MARY   4/30/2021

## 2021-04-30 NOTE — TELEPHONE ENCOUNTER
----- Message from Christi Aragon MD sent at 4/30/2021  9:09 AM EDT -----  Regarding: request  1 week: wound check  2 months: FU with me, with CIED support  TY

## 2021-04-30 NOTE — OP NOTE
OPERATIVE REPORT    RISK STATEMENT  Prior to this operation, I personally explained the potential risks involved in its performance. They include, but are not limited to, loss of life, cerebrovascular accident, embolic damage to other organ systems, myocardial damage including infarction/perforation/valve complex damage, damage to esophagus including perforation or fistulization, damage to lung including pneumothorax, need for emergent closed or open-chest surgery, septicemia or other infection, and anesthesia-related complications including allergy and aspiration. I believe that he is capable of understanding and does understand the risks of this procedure, and has made an informed decision to proceed. DATE OF SURGERY  4/30/2021     SURGEON  Wayne Barth    ASSISTANT SURGEONS  None    PREOPERATIVE DIAGNOSES   Heart failure with reduced ejection fraction. Meets all class I criteria for ICD system implantation as a \"primary prevention\" gesture. POSTOPERATIVE DIAGNOSES  Same    OPERATION(S)   Implantation of dual-chamber cardioverter-defibrillator system    ANESTHESIA   MAC    ESTIMATED BLOOD LOSS  less than 50      RADIATION DOSIMETRY  22 mGy    CONTRAST VOLUME  10 cc    COMPLICATIONS  None apparent at the time of this report. DETAILED DESCRIPTION OF THE SURGICAL TECHNIQUE     Anesthesia was handled by the anesthesiology service. Intravenous antibiotic was administered prior to the procedure. The patient was prepped and draped in usual sterile fashion. An incision was made in the skin overlying his left deltopectoral groove, and carried down to the pectoralis major muscle fascia. A \"pocket\" was fashioned along this plane to [facility] extravascular system materials. Access to the central circulation was achieved utilizing axillary vein puncture technique. Two leads were implanted into the heart. The lead terminating in the right atrium is a [employer] [id number] ([id number]).   Amplitude 2.8 mV, capture threshold 0.7 V, impedance 527 ohms. The lead terminating in the right ventricle is a [employer] [id number] ([id number]). Amplitude 10.4 mV, capture threshold 0.4 V, impedance 482 ohms. These leads were attached to a pulse generator, [employer] [id number] ([id number]). Extravascular system materials were placed into the pocket. Pocket wound was closed using Polysorb. The procedure was well-tolerated. The patient was sent to the recovery area in good condition.     Aylin Marquez MD, [address] [place] [address]  Cardiac Electrophysiology  [phone]    Electronically signed by Aylin Marquez on 4/30/21 at 8:51 AM EDT

## 2021-04-30 NOTE — ANESTHESIA POSTPROCEDURE EVALUATION
Department of Anesthesiology  Postprocedure Note    Patient: Ruben Cruz  MRN: [id number]  YOB: 1955  Date of evaluation: 4/30/2021  Time:  10:17 AM     Procedure Summary     Date: 04/30/21 Room / Location: [facility] CATH LAB    Anesthesia Start: 0740 Anesthesia Stop: 4530    Procedure: ICD W ANESTHESIA Diagnosis: Unspecified atrial fibrillation    Scheduled Providers: MARICRUZ Sims - MARY; Wilmer Albright MD Responsible Provider: Wilmer Albright MD    Anesthesia Type: MAC ASA Status: 4          Anesthesia Type: MAC    Yohan Phase I:      Yohan Phase II:      Last vitals: Reviewed and per EMR flowsheets.        Anesthesia Post Evaluation    Patient location during evaluation: PACU  Patient participation: complete - patient participated  Level of consciousness: awake  Pain score: 0  Airway patency: patent  Nausea & Vomiting: no nausea  Complications: no  Cardiovascular status: hemodynamically stable  Respiratory status: acceptable  Hydration status: stable

## 2021-05-03 ENCOUNTER — TELEPHONE (OUTPATIENT)
Dept: CARDIOLOGY | Age: 66
End: 2021-05-03

## 2021-05-03 NOTE — TELEPHONE ENCOUNTER
I called Flaco to see how he's doing since his ICD insertion on 4/30/21. He states his incision is a little sore; but overall he's doing fine. He states the incision is JATINDER & denies any fevers, redness, bleeding, drainage, or swelling at ICD incision site. He states he's not wearing his sling; but he's careful not to lift his arm over his shoulder. He's aware of [facility] [address] on 5/7/21 at 9 am.  He offers no complaints & is thankful for the phone call.

## 2021-05-07 ENCOUNTER — NURSE ONLY (OUTPATIENT)
Dept: NON INVASIVE DIAGNOSTICS | Age: 66
End: 2021-05-07
Payer: COMMERCIAL

## 2021-05-07 DIAGNOSIS — I42.0 DILATED CARDIOMYOPATHY (HCC): ICD-10-CM

## 2021-05-07 DIAGNOSIS — Z95.810 IMPLANTABLE CARDIOVERTER-DEFIBRILLATOR (ICD) IN SITU: Primary | ICD-10-CM

## 2021-05-07 PROCEDURE — 93283 PRGRMG EVAL IMPLANTABLE DFB: CPT | Performed by: SPECIALIST

## 2021-05-07 NOTE — PROGRESS NOTES
See [facility] [place] report. Wound and ICD check after implant.      Tip Albright RN, BSN  [place]

## 2021-06-22 ENCOUNTER — TELEPHONE (OUTPATIENT)
Dept: ADMINISTRATIVE | Age: 66
End: 2021-06-22

## 2021-06-22 NOTE — TELEPHONE ENCOUNTER
Paperwork was received from Cardio, patient has no showed or cancelled all appointments since January with Dr Jesu Evans. Per Dr Jesu Evans paperwork cannot be completed without an appt.  Patient informed and states he will see us July 13

## 2021-06-23 ENCOUNTER — OFFICE VISIT (OUTPATIENT)
Dept: NON INVASIVE DIAGNOSTICS | Age: 66
End: 2021-06-23
Payer: COMMERCIAL

## 2021-06-23 VITALS
SYSTOLIC BLOOD PRESSURE: 114 MMHG | HEART RATE: 71 BPM | DIASTOLIC BLOOD PRESSURE: 82 MMHG | OXYGEN SATURATION: 99 % | HEIGHT: 74 IN | WEIGHT: 211 LBS | RESPIRATION RATE: 18 BRPM | BODY MASS INDEX: 27.08 KG/M2

## 2021-06-23 DIAGNOSIS — I48.91 ATRIAL FIBRILLATION, UNSPECIFIED TYPE (HCC): Primary | ICD-10-CM

## 2021-06-23 DIAGNOSIS — I42.0 DILATED CARDIOMYOPATHY (HCC): ICD-10-CM

## 2021-06-23 DIAGNOSIS — Z95.810 IMPLANTABLE CARDIOVERTER-DEFIBRILLATOR (ICD) IN SITU: ICD-10-CM

## 2021-06-23 PROBLEM — R06.02 SHORTNESS OF BREATH: Status: ACTIVE | Noted: 2021-06-23

## 2021-06-23 PROCEDURE — 99215 OFFICE O/P EST HI 40 MIN: CPT | Performed by: SPECIALIST

## 2021-06-23 PROCEDURE — 93283 PRGRMG EVAL IMPLANTABLE DFB: CPT | Performed by: SPECIALIST

## 2021-06-23 NOTE — PROGRESS NOTES
Tu Estes paid his follow up visit to the [facility]) Cardiac Electrophysiology office on 6/23/21. INTERIM SYMPTOMS  1. Angina or equivalent: no  2. Dyspnea: no  3. Syncope: no  4. Lightheadedness: orthostatic, mild, stable  5. Palpitation: no  6. Fatigue: no    7. Other: no    SYNOPSIS  1. Poor metabolic health incurring multiple comorbid conditions. 2. Heart failure with reduced ejection fraction: etiology non-ischemic Reasonable medicines. Narrow QRS. 3. Sinus node somnolence. 4. Atrioventricular conduction is normal.  5. Intraventricular conduction is normal.  6. Atrial tachyarrhythmia: trivial interim burden. 7. Ventricular tachyarrhythmia: no interim burden. 8. Cardioverter-defibrillator: dual chamber system, implanted 4/30/21 as a \"primary prevention\" gesture. Normal interim function. 9. CHADS-VASC > 0. Not current receiving antithrombotic therapy. MY PLAN  1. Continue to oversee dysrhythmia portfolio and cardioverter-defibrillator system. Aylin Marquez MD, [address] [place] [address]      A total of 40 minutes were spent in preparation for the clinic visit, reviewing records/tests, counseling/education of the patient, ordering medications/tests/procedures, coordinating care, and documenting clinical information in the EHR.

## 2021-06-23 NOTE — LETTER
Gopal Soto paid his follow up visit to the [facility]) Cardiac Electrophysiology office on 6/23/21. INTERIM SYMPTOMS  1. Angina or equivalent: no  2. Dyspnea: no  3. Syncope: no  4. Lightheadedness: orthostatic, mild, stable  5. Palpitation: no  6. Fatigue: no    7. Other: no    SYNOPSIS  1. Poor metabolic health incurring multiple comorbid conditions. 2. Heart failure with reduced ejection fraction: etiology non-ischemic Reasonable medicines. Narrow QRS. 3. Sinus node somnolence. 4. Atrioventricular conduction is normal.  5. Intraventricular conduction is normal.  6. Atrial tachyarrhythmia: trivial interim burden. 7. Ventricular tachyarrhythmia: no interim burden. 8. Cardioverter-defibrillator: dual chamber system, implanted 4/30/21 as a \"primary prevention\" gesture. Normal interim function. 9. CHADS-VASC > 0. Not current receiving antithrombotic therapy. MY PLAN  1. Continue to oversee dysrhythmia portfolio and cardioverter-defibrillator system.     Ean Gunter MD, [facility]

## 2021-07-13 ENCOUNTER — OFFICE VISIT (OUTPATIENT)
Dept: FAMILY MEDICINE CLINIC | Age: 66
End: 2021-07-13

## 2021-07-13 VITALS
SYSTOLIC BLOOD PRESSURE: 120 MMHG | DIASTOLIC BLOOD PRESSURE: 70 MMHG | HEART RATE: 71 BPM | WEIGHT: 211 LBS | BODY MASS INDEX: 27.09 KG/M2 | TEMPERATURE: 98.3 F | OXYGEN SATURATION: 96 %

## 2021-07-13 DIAGNOSIS — I50.32 CHRONIC CONGESTIVE HEART FAILURE WITH LEFT VENTRICULAR DIASTOLIC DYSFUNCTION (HCC): Primary | ICD-10-CM

## 2021-07-13 DIAGNOSIS — I34.0 NONRHEUMATIC MITRAL VALVE REGURGITATION: ICD-10-CM

## 2021-07-13 DIAGNOSIS — Z11.59 ENCOUNTER FOR HEPATITIS C SCREENING TEST FOR LOW RISK PATIENT: ICD-10-CM

## 2021-07-13 DIAGNOSIS — Z12.5 ENCOUNTER FOR SCREENING FOR MALIGNANT NEOPLASM OF PROSTATE: ICD-10-CM

## 2021-07-13 PROCEDURE — 99214 OFFICE O/P EST MOD 30 MIN: CPT | Performed by: FAMILY MEDICINE

## 2021-07-13 ASSESSMENT — PATIENT HEALTH QUESTIONNAIRE - PHQ9
SUM OF ALL RESPONSES TO PHQ QUESTIONS 1-9: 0
SUM OF ALL RESPONSES TO PHQ9 QUESTIONS 1 & 2: 0
2. FEELING DOWN, DEPRESSED OR HOPELESS: 0
1. LITTLE INTEREST OR PLEASURE IN DOING THINGS: 0

## 2021-07-13 ASSESSMENT — ENCOUNTER SYMPTOMS
EYE REDNESS: 0
SINUS PAIN: 0
COUGH: 0
BLOOD IN STOOL: 0
ABDOMINAL PAIN: 0
WHEEZING: 0
TROUBLE SWALLOWING: 0
BACK PAIN: 0
SORE THROAT: 0
PHOTOPHOBIA: 0
DIARRHEA: 0
CONSTIPATION: 0
VOMITING: 0
SHORTNESS OF BREATH: 1

## 2021-07-13 NOTE — PROGRESS NOTES
OFFICE NOTE    7/13/21  Name: Jesse Batres  FKP:[birth year]   Sex:male   Age:65 y.o. SUBJECTIVE  Chief Complaint   Patient presents with    Annual Exam     wants disability papers filled out        HPI Hasn't worked since December 15th shortly before we last saw him. Had defibrilllator put in for non-ischemic cardiolyopathy. 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 Says he has SSD, won't be eligible for Medicare for 2 years. Wants long term disability form filled out for work though unsure how this will help him. We insisted on seeing him as much has transpired    Review of Systems   Constitutional: Positive for fatigue. Negative for appetite change, fever and unexpected weight change. HENT: Negative for congestion, ear pain, hearing loss, sinus pain, sore throat and trouble swallowing. Eyes: Negative for photophobia, redness and visual disturbance. Respiratory: Positive for shortness of breath. Negative for cough and wheezing. Cardiovascular: Negative for chest pain, palpitations and leg swelling. Gastrointestinal: Negative for abdominal pain, blood in stool, constipation, diarrhea and vomiting. Endocrine: Negative for cold intolerance, polydipsia and polyuria. Genitourinary: Positive for urgency. Negative for difficulty urinating, genital sores and hematuria. Musculoskeletal: Positive for arthralgias. Negative for back pain and joint swelling. Skin: Negative for pallor and rash. Allergic/Immunologic: Negative for food allergies. Neurological: Positive for weakness. Negative for dizziness, tremors, seizures, syncope, numbness and headaches. Hematological: Negative for adenopathy. Does not bruise/bleed easily. Psychiatric/Behavioral: Negative for agitation, dysphoric mood, hallucinations and sleep disturbance. The patient is not nervous/anxious. All other systems reviewed and are negative.            Current Outpatient Medications:     ENTRESTO 24-26 MG per tablet, take 1 tablet by mouth twice a day, Disp: , Rfl:     carvedilol (COREG) 12.5 MG tablet, take 1 tablet by mouth twice a day, Disp: , Rfl:     spironolactone (ALDACTONE) 25 MG tablet, 25 mg daily Take 1 tablet daily, Disp: , Rfl:     bumetanide (BUMEX) 1 MG tablet, Take 1 mg by mouth daily, Disp: , Rfl:   No Known Allergies    Past Medical History:   Diagnosis Date    CHF [facility]congestive heart failure) ([facility] [address]     Hypertension      Past Surgical History:   Procedure Laterality Date    CARDIAC CATHETERIZATION  12/21/2020    coronaries with lv pressures    CARDIAC DEFIBRILLATOR PLACEMENT  04/30/2021    DUAL ICD  ([facility])    Dr. Ascencion Allan     No family history on file. Social History     Tobacco History     Smoking Status  Former Smoker Smoking Frequency  0 packs/day for 42 years (0 pk yrs) Smoking Tobacco Type  Cigars    Smokeless Tobacco Use  Never Used          Alcohol History     Alcohol Use Status  Not Currently          Drug Use     Drug Use Status  Never          Sexual Activity     Sexually Active  Not Currently Partners  Female                OBJECTIVE  Vitals:    07/13/21 1448   BP: 120/70   Pulse: 71   Temp: 98.3 °F (36.8 °C)   SpO2: 96%   Weight: 211 lb (95.7 kg)        Body mass index is 27.09 kg/m². Orders Placed This Encounter   Procedures    Comprehensive Metabolic Panel     Standing Status:   Future     Standing Expiration Date:   7/8/2022    Lipid Panel     Standing Status:   Future     Standing Expiration Date:   7/8/2022     Order Specific Question:   Is Patient Fasting?/# of Hours     Answer:   10    Urinalysis     Standing Status:   Future     Standing Expiration Date:   7/8/2022     Order Specific Question:   SPECIFY(EX-CATH,MIDSTREAM,CYSTO,ETC)? Answer:   midstream        EXAM   Physical Exam  Vitals and nursing note reviewed. Constitutional:       Appearance: Normal appearance. He is well-developed and normal weight.    HENT:      Right Ear: Tympanic membrane, ear canal and external ear normal.      Left Ear: Tympanic membrane, ear canal and external ear normal.      Nose: Nose normal. No congestion or rhinorrhea. Mouth/Throat:      Pharynx: Oropharynx is clear. No posterior oropharyngeal erythema. Eyes:      General: No scleral icterus. Conjunctiva/sclera: Conjunctivae normal.      Pupils: Pupils are equal, round, and reactive to light. Neck:      Thyroid: No thyroid mass or thyromegaly. Vascular: No carotid bruit or JVD. Trachea: Trachea normal.   Cardiovascular:      Rate and Rhythm: Normal rate and regular rhythm. Pulses: Normal pulses. Heart sounds: Normal heart sounds. No murmur heard. No gallop. Comments: Defibbrillator noted left pectoral area  Pulmonary:      Effort: Pulmonary effort is normal.      Breath sounds: Normal breath sounds. No wheezing or rales. Abdominal:      General: Bowel sounds are normal. There is no distension. Palpations: Abdomen is soft. There is no mass. Tenderness: There is no abdominal tenderness. There is no guarding. Hernia: No hernia is present. Musculoskeletal:         General: No swelling or tenderness. Normal range of motion. Cervical back: Normal range of motion and neck supple. Right lower leg: No edema. Left lower leg: No edema. Lymphadenopathy:      Cervical: No cervical adenopathy. Skin:     General: Skin is warm and dry. Coloration: Skin is not jaundiced. Findings: No bruising or rash. Neurological:      General: No focal deficit present. Mental Status: He is alert and oriented to person, place, and time. Sensory: No sensory deficit. Motor: No weakness or abnormal muscle tone.       Coordination: Coordination normal.      Gait: Gait normal.   Psychiatric:         Mood and Affect: Mood normal.         Behavior: Behavior normal.           Flaco was seen today for annual exam.    Diagnoses and all orders for this visit:    Chronic congestive heart failure with left ventricular diastolic dysfunction (HCC)  -     Comprehensive Metabolic Panel; Future  -     Lipid Panel; Future  -     Cancel: TSH without Reflex; Future  -     Urinalysis; Future  Suspect meds have improved things some may need another ECHO to confirm  Nonrheumatic mitral valve regurgitation  -     Comprehensive Metabolic Panel; Future  -     Lipid Panel; Future  -     Cancel: TSH without Reflex; Future  -     Urinalysis; Future  Significant murmur not heard. Again may be some better  Encounter for hepatitis C screening test for low risk patient  -     Cancel: Hepatitis C Antibody; Future  Slightly tender RUQ no hepatomegally  Encounter for screening for malignant neoplasm of prostate  -     Cancel: PSA screening; Future    Administrative paperwork completed. Believe him to be long term disabled. Return in about 4 months (around 11/13/2021).     Electronically signed by Jose Daniel Correa MD on 7/13/21 at 3:24 PM EDT

## 2022-03-21 ENCOUNTER — TELEPHONE (OUTPATIENT)
Dept: NON INVASIVE DIAGNOSTICS | Age: 67
End: 2022-03-21

## 2022-03-21 NOTE — TELEPHONE ENCOUNTER
Called and spoke with patient. Patient had SVT at a rate of 170 back in February. Reviewed Dr. Thi Echols recommendation. Patient states he has been feeling well.        Scheduled device clinic check for 3/24/2022 @9:00 per patient's request.     Denise See Lisa Louie

## 2022-03-24 ENCOUNTER — NURSE ONLY (OUTPATIENT)
Dept: NON INVASIVE DIAGNOSTICS | Age: 67
End: 2022-03-24

## 2022-12-08 NOTE — PROGRESS NOTES
[facility] CARDIOLOGY  CARDIAC ELECTROPHYSIOLOGY DEPARTMENT/DIVISION OF CARDIOLOGY  Outpatient Progress Report  PATIENT: Elda [facility] RECORD NUMBER: [id number]  DATE OF SERVICE:  12/9/2022  ATTENDING ELECTROPHYSIOLOGIST:  Chavo Sullivan D.O.  REFERRING PHYSICIAN: No ref. provider found and Mayra Bess MD  CHIEF COMPLAINT: ICD insitu    HPI: Master Del Cid is a 74 y.o. male with a history of NYHA class I HFrEF-nonischemic sp BSCI- ICD (DOI: 4/30/2021-Dr. Yariel Price), HTN, and prior EtOH use (2-4 beers per day, cessation 2020). He is managed by [facility] cardiology with Bumex 1 mg daily, carvedilol 12.5 mg twice daily, and spironolactone 25 mg daily. Prior to 2020, he reports drinking 2-4 beers per day, but reports no alcohol since 2020. In 12/2020, he was diagnosed with nonischemic cardiomyopathy based on TTE and LHC. He was treated with guideline directed medical therapy. In 3/2021, repeat TTE reported LVEF of 10-15%, which was treated by Dr. Yariel Price with [address] ICD. Patient presents today, 12/9/2022; to transfer EP care to my office. Patient's device is enrolled in remote monitoring, which most recently reported stable device function, 64% RA pacing, 1% RV pacing, and episodes of SVT versus sinus tachycardia. He denies any complaints at this time. He denies any family history of SCD or cardiac dysfunction. He is uncertain to the underlying etiology of his cardiomyopathy. Prior cardiac testing:   ECG (4/13/2021): Sinus at 53 bpm, nonspecific ST and T wave abnormalities, QTC = 414 ms. TTE: 3/1/2021: LVEF 10-15% with global hypokinesis, mild concentric LVH, LV dilation, moderate LAE, mild GARETH, and mild-moderate MR.  LHC (12/21/2020): RCA dominant circulation 30% mid LAD stenosis, 30% ostial LCx stenosis, and 10% mid RCA stenosis.   TTE: 12/29/2020: LVEF: 10-15% with global hypokinesis, LV dilation, mild LVH, moderate-severe MR, mild EDIE, mild TR, mild pulmonary HTN, large left pleural effusion, and small anterior pericardial effusion. Past Medical History:   Diagnosis Date    CHF (congestive heart failure) ([facility] [address] [address]     Hypertension      Past Surgical History:   Procedure Laterality Date    CARDIAC CATHETERIZATION  12/21/2020    coronaries with lv pressures    CARDIAC DEFIBRILLATOR PLACEMENT  04/30/2021    DUAL ICD  ([facility])    Dr. rTudi Dupont      No family history on file. There is no family history of sudden cardiac arrest    Social History     Tobacco Use    Smoking status: Former     Packs/day: 0.00     Years: 42.00     Pack years: 0.00     Types: Cigars, Cigarettes    Smokeless tobacco: Never   Substance Use Topics    Alcohol use: Not Currently       Current Outpatient Medications   Medication Sig Dispense Refill    carvedilol (COREG) 12.5 MG tablet take 1 tablet by mouth twice a day      spironolactone (ALDACTONE) 25 MG tablet 25 mg daily Take 1 tablet daily      bumetanide (BUMEX) 1 MG tablet Take 1 mg by mouth daily      ENTRESTO 24-26 MG per tablet take 1 tablet by mouth twice a day (Patient not taking: Reported on 12/9/2022)       No current facility-administered medications for this visit. No Known Allergies    ROS:   Constitutional: Negative for fever, activity change and appetite change. HENT: Negative for epistaxis. Eyes: Negative for diploplia, blurred vision. Respiratory: Negative for cough, chest tightness, shortness of breath and wheezing. Cardiovascular: pertinent positives in HPI  Gastrointestinal: Negative for abdominal pain and blood in stool. Genitourinary: Negative for hematuria and difficulty urinating. Musculoskeletal: Negative for myalgias and gait problem. Skin: Negative for color change and rash. Neurological: Negative for syncope and light-headedness. Psychiatric/Behavioral: Negative for confusion and agitation. The patient is not nervous/anxious.   Heme: no bleeding disorders, no melena or were evaluated per above and in the scanned document, along with iterative adjustments (capture thresholds) to assess and select the most appropriate final programming to provide for consistent delivery of the appropriate therapy and to verify function of the device. Assessment/plan:  NYHA class I HFrEF-nonischemic sp BSCI- ICD (DOI: 4/30/2021-Dr. Barth)  - In 12/2020, he was diagnosed with nonischemic cardiomyopathy based on TTE and LHC. He was treated with guideline directed medical therapy. In 3/2021, repeat TTE reported LVEF of 10-15%, which was treated by Dr. Manuel Matthews with [address] ICD. -Etiology of patient's cardiomyopathy is unclear at this time. Recommend cardiac MRI to further clarify. Given the presence of his ICD, recommend cardiac MRI to be obtained at [facility] due to limitations of cardiac MRI at our institution at this time.  -Stable device function interrogation of device today.  -Continue remote monitoring of ICD every 91 days.  -Follow-up with this office in 1 year. In future, consider reducing lower rate limit to 50-60 bpm.  -Medical management per [facility] cardiology. I spent a total of 30 minutes reviewing previous notes, test results, and face to face with the patient discussing the diagnosis and importance of compliance with the treatment plan as well as documenting on the day of the visit. Time of the day of service includes:  Preparing to see the patient (eg. Review of the medical record, such as tests). Obtaining and/or reviewing separately obtained history. Ordering prescription medications, tests, and/or procedures. Communicating results to the patient/family/caregiver. Counseling/educating the patient/family/caregiver. Documenting clinical information in the patients electronic record. Coordination of care for the patient. Performing a medical appropriate exam and/or evaluation.      Thank you for allowing me to participate in your patient's care. Please call me if there are any questions. Gee Smith D.O.   Cardiac Electrophysiology  [facility]    CC: Andrzej Izaguirre MD

## 2022-12-09 ENCOUNTER — OFFICE VISIT (OUTPATIENT)
Dept: NON INVASIVE DIAGNOSTICS | Age: 67
End: 2022-12-09

## 2022-12-09 VITALS
BODY MASS INDEX: 27.13 KG/M2 | DIASTOLIC BLOOD PRESSURE: 88 MMHG | SYSTOLIC BLOOD PRESSURE: 120 MMHG | WEIGHT: 211.4 LBS | HEART RATE: 72 BPM | HEIGHT: 74 IN | RESPIRATION RATE: 16 BRPM

## 2022-12-09 DIAGNOSIS — I42.0 DILATED CARDIOMYOPATHY (HCC): ICD-10-CM

## 2022-12-09 DIAGNOSIS — Z95.810 IMPLANTABLE CARDIOVERTER-DEFIBRILLATOR (ICD) IN SITU: ICD-10-CM

## 2022-12-09 DIAGNOSIS — I48.91 ATRIAL FIBRILLATION, UNSPECIFIED TYPE (HCC): Primary | ICD-10-CM

## 2022-12-09 NOTE — PATIENT INSTRUCTIONS
No medication changes at this time. Continue remote monitoring of ICD every 91 days. You will be contacted to schedule cardiac MRI at [facility]. Follow-up with his office in 1 year. Continue to follow-up with [facility] cardiology for medical management of your cardiomyopathy.

## 2022-12-12 ENCOUNTER — TELEPHONE (OUTPATIENT)
Dept: NON INVASIVE DIAGNOSTICS | Age: 67
End: 2022-12-12

## 2022-12-12 NOTE — TELEPHONE ENCOUNTER
----- Message from Leroy Garcia DO sent at 12/9/2022 10:33 AM EST -----  Regarding: cMRI  Please arrange [facility]MRI at [facility] Michelle Garcia DO

## 2023-01-19 ENCOUNTER — TELEPHONE (OUTPATIENT)
Dept: NON INVASIVE DIAGNOSTICS | Age: 68
End: 2023-01-19

## 2023-01-19 NOTE — TELEPHONE ENCOUNTER
I called Akhil Soni and asked if his MRI at [facility] has been scheduled, he said yes but not until April because he wanted to wait.

## 2023-08-24 ENCOUNTER — TELEPHONE (OUTPATIENT)
Dept: NON INVASIVE DIAGNOSTICS | Age: 68
End: 2023-08-24

## 2023-08-24 NOTE — TELEPHONE ENCOUNTER
----- Message from Tyler Paredes DO sent at 8/21/2023 11:11 PM EDT -----  Regarding: agustina, jacky, svt  Remote with svt. I saw patient in 12/2022 and requested he obtain cardiac MRI at [facility]. Did he obtain MRI? If not please have him schedule? Please contact Agustina to ask if patient is not on ACE-I for any particular reason?  If not, I would recommend he change coreg to metoprolol and start lisinopril 5 mg daily.    -Tyler Paredes,

## 2023-08-25 NOTE — TELEPHONE ENCOUNTER
Patient declines to have MRI done and still waiting on response from [facility].    Electronically signed by Jax Modi MA on 8/25/2023 at 9:20 AM

## 2023-08-25 NOTE — TELEPHONE ENCOUNTER
Response from Dr. Luigi Bell sent to Dr. Ted Saunders and Bari Gallegos for further instruction.      Electronically signed by Barb Corrigan MA on 8/25/2023 at 2:39 PM

## 2023-08-30 ENCOUNTER — TELEPHONE (OUTPATIENT)
Dept: NON INVASIVE DIAGNOSTICS | Age: 68
End: 2023-08-30

## 2023-08-30 RX ORDER — LISINOPRIL 5 MG/1
5 TABLET ORAL DAILY
Qty: 30 TABLET | Refills: 5 | Status: SHIPPED | OUTPATIENT
Start: 2023-08-30

## 2023-08-30 RX ORDER — LISINOPRIL 5 MG/1
5 TABLET ORAL DAILY
COMMUNITY
End: 2023-08-30 | Stop reason: SDUPTHER

## 2023-08-30 RX ORDER — METOPROLOL SUCCINATE 50 MG/1
50 TABLET, EXTENDED RELEASE ORAL DAILY
Qty: 30 TABLET | Refills: 5 | Status: SHIPPED | OUTPATIENT
Start: 2023-08-30

## 2023-08-30 RX ORDER — METOPROLOL SUCCINATE 50 MG/1
50 TABLET, EXTENDED RELEASE ORAL DAILY
COMMUNITY
End: 2023-08-30 | Stop reason: SDUPTHER

## 2023-08-30 NOTE — TELEPHONE ENCOUNTER
----- Message from Mateo Celaya DO sent at 8/29/2023  8:23 PM EDT -----  Regarding: RE: ACE-I response from   Recommend:  1. STOP carvedilol. 2. START metoprolol XL 50 mg daily. 3. START lisinopril 5 mg daily. 4. BMP 1 week AFTER starting lisinopril.    -Mateo Celaya DO   ----- Message -----  From: Tiffanie Clifford MA  Sent: 8/25/2023   2:23 PM EDT  To: Mateo Celaya DO, #  Subject: ACE-I response from                            Per Dr. Abdullahi [facility], he has not seen the patient since May 2021. At the time of that visit, the patient was on Bumex 2 mg daily, carvedilol 12.5 mg BID, Entresto 24/26 mg bid, and spironolactone 25 mg daily. Per Dr. Abdullahi [facility], the patient refused Deloras Alondra or any SGLT2 inhibitor. Please advise.     Electronically signed by Tiffanie Clifford MA on 8/25/2023 at 2:22 PM

## 2023-08-30 NOTE — TELEPHONE ENCOUNTER
Patient states he does not have money so he is not sure when he will start new medications. Specific instructions given on when to stop and start medication changes and to get BMP one week from start date of new medications. Patient verbalized understanding. Chart updated.      Electronically signed by Pan Barksdale MA on 8/30/2023 at 1:23 PM

## 2023-09-12 ENCOUNTER — TELEPHONE (OUTPATIENT)
Dept: NON INVASIVE DIAGNOSTICS | Age: 68
End: 2023-09-12

## 2023-09-12 RX ORDER — CARVEDILOL 12.5 MG/1
12.5 TABLET ORAL 2 TIMES DAILY WITH MEALS
COMMUNITY

## 2023-09-12 NOTE — TELEPHONE ENCOUNTER
Reviewed latitude from 9.12.2023. 1 VT treated with ATP x1 on 9/11/2023. EGM is c/w MMVT in VF Zone treated w/ Burst ATP x 1 (Type II Break) and Aborted Charge     Called and spoke with patient. He states he is feeling well. He was directed back in August to stop coreg and start Toprol and Lisinopril. Patient states he has no money and he does not wish to change mediations at this time. He is on Coreg 12.5mg BID. Forwarding to Dr. Elvi Cordoba.        [address] [place] [address]

## 2023-09-23 PROCEDURE — 93295 DEV INTERROG REMOTE 1/2/MLT: CPT | Performed by: STUDENT IN AN ORGANIZED HEALTH CARE EDUCATION/TRAINING PROGRAM

## 2023-09-23 PROCEDURE — 93296 REM INTERROG EVL PM/IDS: CPT | Performed by: STUDENT IN AN ORGANIZED HEALTH CARE EDUCATION/TRAINING PROGRAM

## 2023-09-24 PROCEDURE — 93297 REM INTERROG DEV EVAL ICPMS: CPT | Performed by: STUDENT IN AN ORGANIZED HEALTH CARE EDUCATION/TRAINING PROGRAM

## 2023-09-24 PROCEDURE — G2066 INTER DEVC REMOTE 30D: HCPCS | Performed by: STUDENT IN AN ORGANIZED HEALTH CARE EDUCATION/TRAINING PROGRAM

## 2023-12-23 PROCEDURE — 93295 DEV INTERROG REMOTE 1/2/MLT: CPT | Performed by: STUDENT IN AN ORGANIZED HEALTH CARE EDUCATION/TRAINING PROGRAM

## 2023-12-23 PROCEDURE — 93296 REM INTERROG EVL PM/IDS: CPT | Performed by: STUDENT IN AN ORGANIZED HEALTH CARE EDUCATION/TRAINING PROGRAM

## 2023-12-24 PROCEDURE — G2066 INTER DEVC REMOTE 30D: HCPCS | Performed by: STUDENT IN AN ORGANIZED HEALTH CARE EDUCATION/TRAINING PROGRAM

## 2023-12-24 PROCEDURE — 93297 REM INTERROG DEV EVAL ICPMS: CPT | Performed by: STUDENT IN AN ORGANIZED HEALTH CARE EDUCATION/TRAINING PROGRAM

## 2024-01-17 ENCOUNTER — HOSPITAL ENCOUNTER (EMERGENCY)
Age: 69
Discharge: PSYCHIATRIC OR SUBSTANCE USE FACILITY | End: 2024-01-18
Attending: EMERGENCY MEDICINE
Payer: MEDICARE

## 2024-01-17 ENCOUNTER — APPOINTMENT (OUTPATIENT)
Dept: CT IMAGING | Age: 69
End: 2024-01-17
Payer: MEDICARE

## 2024-01-17 DIAGNOSIS — T59.811A SMOKE INHALATION: Primary | ICD-10-CM

## 2024-01-17 LAB
ALBUMIN SERPL-MCNC: 4.4 G/DL (ref 3.5–5.2)
ALP SERPL-CCNC: 51 U/L (ref 40–129)
ALT SERPL-CCNC: 13 U/L (ref 0–40)
ANION GAP SERPL CALCULATED.3IONS-SCNC: 15 MMOL/L (ref 7–16)
APAP SERPL-MCNC: <5 UG/ML (ref 10–30)
AST SERPL-CCNC: 26 U/L (ref 0–39)
BASOPHILS # BLD: 0.07 K/UL (ref 0–0.2)
BASOPHILS NFR BLD: 1 % (ref 0–2)
BILIRUB SERPL-MCNC: 0.8 MG/DL (ref 0–1.2)
BUN SERPL-MCNC: 18 MG/DL (ref 6–23)
CALCIUM SERPL-MCNC: 9.4 MG/DL (ref 8.6–10.2)
CHLORIDE SERPL-SCNC: 97 MMOL/L (ref 98–107)
CO2 SERPL-SCNC: 23 MMOL/L (ref 22–29)
CREAT SERPL-MCNC: 1.2 MG/DL (ref 0.7–1.2)
EOSINOPHIL # BLD: 0.1 K/UL (ref 0.05–0.5)
EOSINOPHILS RELATIVE PERCENT: 1 % (ref 0–6)
ERYTHROCYTE [DISTWIDTH] IN BLOOD BY AUTOMATED COUNT: 13 % (ref 11.5–15)
ETHANOLAMINE SERPL-MCNC: <10 MG/DL
GFR SERPL CREATININE-BSD FRML MDRD: >60 ML/MIN/1.73M2
GLUCOSE SERPL-MCNC: 95 MG/DL (ref 74–99)
HCT VFR BLD AUTO: 41.9 % (ref 37–54)
HGB BLD-MCNC: 14.2 G/DL (ref 12.5–16.5)
IMM GRANULOCYTES # BLD AUTO: 0.05 K/UL (ref 0–0.58)
IMM GRANULOCYTES NFR BLD: 0 % (ref 0–5)
LYMPHOCYTES NFR BLD: 2.22 K/UL (ref 1.5–4)
LYMPHOCYTES RELATIVE PERCENT: 15 % (ref 20–42)
MCH RBC QN AUTO: 27.6 PG (ref 26–35)
MCHC RBC AUTO-ENTMCNC: 33.9 G/DL (ref 32–34.5)
MCV RBC AUTO: 81.5 FL (ref 80–99.9)
MONOCYTES NFR BLD: 1.17 K/UL (ref 0.1–0.95)
MONOCYTES NFR BLD: 8 % (ref 2–12)
NEUTROPHILS NFR BLD: 75 % (ref 43–80)
NEUTS SEG NFR BLD: 10.96 K/UL (ref 1.8–7.3)
PLATELET # BLD AUTO: 277 K/UL (ref 130–450)
PMV BLD AUTO: 9.3 FL (ref 7–12)
POTASSIUM SERPL-SCNC: 3.6 MMOL/L (ref 3.5–5)
PROT SERPL-MCNC: 7.6 G/DL (ref 6.4–8.3)
RBC # BLD AUTO: 5.14 M/UL (ref 3.8–5.8)
SALICYLATES SERPL-MCNC: <0.3 MG/DL (ref 0–30)
SODIUM SERPL-SCNC: 135 MMOL/L (ref 132–146)
TOXIC TRICYCLIC SC,BLOOD: NEGATIVE
WBC OTHER # BLD: 14.6 K/UL (ref 4.5–11.5)

## 2024-01-17 PROCEDURE — 70450 CT HEAD/BRAIN W/O DYE: CPT

## 2024-01-17 PROCEDURE — 96372 THER/PROPH/DIAG INJ SC/IM: CPT

## 2024-01-17 PROCEDURE — 93005 ELECTROCARDIOGRAM TRACING: CPT | Performed by: EMERGENCY MEDICINE

## 2024-01-17 PROCEDURE — 80307 DRUG TEST PRSMV CHEM ANLYZR: CPT

## 2024-01-17 PROCEDURE — G0480 DRUG TEST DEF 1-7 CLASSES: HCPCS

## 2024-01-17 PROCEDURE — 99284 EMERGENCY DEPT VISIT MOD MDM: CPT

## 2024-01-17 PROCEDURE — 80053 COMPREHEN METABOLIC PANEL: CPT

## 2024-01-17 PROCEDURE — 85025 COMPLETE CBC W/AUTO DIFF WBC: CPT

## 2024-01-17 PROCEDURE — 80179 DRUG ASSAY SALICYLATE: CPT

## 2024-01-17 PROCEDURE — 6370000000 HC RX 637 (ALT 250 FOR IP): Performed by: EMERGENCY MEDICINE

## 2024-01-17 PROCEDURE — 80143 DRUG ASSAY ACETAMINOPHEN: CPT

## 2024-01-17 RX ORDER — DOXYCYCLINE HYCLATE 100 MG/1
100 CAPSULE ORAL ONCE
Status: COMPLETED | OUTPATIENT
Start: 2024-01-17 | End: 2024-01-17

## 2024-01-17 RX ORDER — DOXYCYCLINE HYCLATE 100 MG
100 TABLET ORAL 2 TIMES DAILY
Qty: 14 TABLET | Refills: 0 | Status: SHIPPED | OUTPATIENT
Start: 2024-01-17 | End: 2024-01-24

## 2024-01-17 RX ADMIN — DOXYCYCLINE HYCLATE 100 MG: 100 CAPSULE ORAL at 20:42

## 2024-01-17 ASSESSMENT — PAIN - FUNCTIONAL ASSESSMENT: PAIN_FUNCTIONAL_ASSESSMENT: NONE - DENIES PAIN

## 2024-01-18 VITALS
OXYGEN SATURATION: 97 % | TEMPERATURE: 97 F | BODY MASS INDEX: 27.43 KG/M2 | RESPIRATION RATE: 20 BRPM | HEART RATE: 94 BPM | SYSTOLIC BLOOD PRESSURE: 125 MMHG | DIASTOLIC BLOOD PRESSURE: 82 MMHG | HEIGHT: 73 IN | WEIGHT: 207 LBS

## 2024-01-18 LAB
AMPHET UR QL SCN: POSITIVE
BARBITURATES UR QL SCN: NEGATIVE
BENZODIAZ UR QL: NEGATIVE
BUPRENORPHINE UR QL: NEGATIVE
CANNABINOIDS UR QL SCN: POSITIVE
CK SERPL-CCNC: 520 U/L (ref 20–200)
COCAINE UR QL SCN: NEGATIVE
EKG ATRIAL RATE: 104 BPM
EKG P AXIS: 78 DEGREES
EKG P-R INTERVAL: 140 MS
EKG Q-T INTERVAL: 374 MS
EKG QRS DURATION: 76 MS
EKG QTC CALCULATION (BAZETT): 491 MS
EKG R AXIS: 25 DEGREES
EKG T AXIS: 43 DEGREES
EKG VENTRICULAR RATE: 104 BPM
FENTANYL UR QL: NEGATIVE
METHADONE UR QL: NEGATIVE
OPIATES UR QL SCN: NEGATIVE
OXYCODONE UR QL SCN: NEGATIVE
PCP UR QL SCN: NEGATIVE
TEST INFORMATION: ABNORMAL

## 2024-01-18 PROCEDURE — 82550 ASSAY OF CK (CPK): CPT

## 2024-01-18 PROCEDURE — 6370000000 HC RX 637 (ALT 250 FOR IP): Performed by: EMERGENCY MEDICINE

## 2024-01-18 PROCEDURE — 6360000002 HC RX W HCPCS: Performed by: EMERGENCY MEDICINE

## 2024-01-18 PROCEDURE — 93010 ELECTROCARDIOGRAM REPORT: CPT | Performed by: INTERNAL MEDICINE

## 2024-01-18 RX ORDER — HALOPERIDOL 5 MG/ML
2.5 INJECTION INTRAMUSCULAR ONCE
Status: COMPLETED | OUTPATIENT
Start: 2024-01-18 | End: 2024-01-18

## 2024-01-18 RX ORDER — LORAZEPAM 1 MG/1
2 TABLET ORAL ONCE
Status: COMPLETED | OUTPATIENT
Start: 2024-01-18 | End: 2024-01-18

## 2024-01-18 RX ORDER — HYDROXYZINE HYDROCHLORIDE 50 MG/ML
50 INJECTION, SOLUTION INTRAMUSCULAR ONCE
Status: COMPLETED | OUTPATIENT
Start: 2024-01-18 | End: 2024-01-18

## 2024-01-18 RX ADMIN — LORAZEPAM 2 MG: 1 TABLET ORAL at 00:48

## 2024-01-18 RX ADMIN — HYDROXYZINE HYDROCHLORIDE 50 MG: 50 INJECTION, SOLUTION INTRAMUSCULAR at 02:15

## 2024-01-18 RX ADMIN — HALOPERIDOL LACTATE 2.5 MG: 5 INJECTION, SOLUTION INTRAMUSCULAR at 02:14

## 2024-01-18 ASSESSMENT — LIFESTYLE VARIABLES: HOW OFTEN DO YOU HAVE A DRINK CONTAINING ALCOHOL: NEVER
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
## 2024-01-18 NOTE — ED NOTES
SW received a call from [facility] ([facility]) who said [facility] will accept (Dr. Vazquez). [facility] requesting pink slip and EKG.
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
## 2024-01-18 NOTE — ED NOTES
Pt states he sees designs that no one else sees and that he thinks it's because of something in his body like his pacemaker or that it may be mind control. Pt reports symptoms starting in August. Pt states sometimes his mind makes him see a parasite in his water.

## 2024-01-18 NOTE — ED NOTES
Pt given a urinal at bedside and instructed to provide urine sample in it. Pt stated he would when he has to pee again.
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
## 2024-01-18 NOTE — ED NOTES
Pt given urine cup and shown to the bathroom for a urine sample. Pt came back with just water in the cup.

## 2024-01-18 NOTE — ED NOTES
Pt agitated again talking about the dust coming down from the ceilings. Pt pacing and showing more agitated movements. Dr. Sneed notified.
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
## 2024-01-18 NOTE — ED NOTES
SW received a call from [facility] ([facility]) [facility] to arrive between 8-8:30am for transport to [facility]. Report number:[phone], bed [address]B.

## 2024-01-18 NOTE — ED NOTES
Pts daughter Jeannine Reina @[phone] called this rn stating they called up to the psych unit and was told that her father is not there informed her that pt has been transferred to another facility ([facility]).

## 2025-01-13 ENCOUNTER — OFFICE VISIT (OUTPATIENT)
Dept: FAMILY MEDICINE CLINIC | Age: 70
End: 2025-01-13
Payer: MEDICARE

## 2025-01-13 VITALS
DIASTOLIC BLOOD PRESSURE: 98 MMHG | RESPIRATION RATE: 17 BRPM | HEART RATE: 77 BPM | TEMPERATURE: 97.1 F | WEIGHT: 209 LBS | HEIGHT: 73 IN | BODY MASS INDEX: 27.7 KG/M2 | OXYGEN SATURATION: 99 % | SYSTOLIC BLOOD PRESSURE: 158 MMHG

## 2025-01-13 DIAGNOSIS — B33.24 VIRAL CARDIOMYOPATHY (HCC): ICD-10-CM

## 2025-01-13 DIAGNOSIS — E78.49 OTHER HYPERLIPIDEMIA: ICD-10-CM

## 2025-01-13 DIAGNOSIS — F23 ACUTE PARANOID REACTION (HCC): ICD-10-CM

## 2025-01-13 DIAGNOSIS — I10 PRIMARY HYPERTENSION: ICD-10-CM

## 2025-01-13 DIAGNOSIS — I34.0 NONRHEUMATIC MITRAL VALVE REGURGITATION: ICD-10-CM

## 2025-01-13 DIAGNOSIS — Z12.5 SCREENING FOR PROSTATE CANCER: ICD-10-CM

## 2025-01-13 DIAGNOSIS — Z23 IMMUNIZATION DUE: ICD-10-CM

## 2025-01-13 DIAGNOSIS — Z00.00 INITIAL MEDICARE ANNUAL WELLNESS VISIT: Primary | ICD-10-CM

## 2025-01-13 LAB
ALBUMIN: 4.2 G/DL (ref 3.5–5.2)
ALP BLD-CCNC: 45 U/L (ref 40–129)
ALT SERPL-CCNC: 14 U/L (ref 0–40)
ANION GAP SERPL CALCULATED.3IONS-SCNC: 12 MMOL/L (ref 7–16)
AST SERPL-CCNC: 18 U/L (ref 0–39)
BASOPHILS ABSOLUTE: 0.06 K/UL (ref 0–0.2)
BASOPHILS RELATIVE PERCENT: 1 % (ref 0–2)
BILIRUB SERPL-MCNC: 0.4 MG/DL (ref 0–1.2)
BUN BLDV-MCNC: 18 MG/DL (ref 6–23)
CALCIUM SERPL-MCNC: 9.2 MG/DL (ref 8.6–10.2)
CHLORIDE BLD-SCNC: 102 MMOL/L (ref 98–107)
CHOLESTEROL, TOTAL: 200 MG/DL
CO2: 27 MMOL/L (ref 22–29)
CREAT SERPL-MCNC: 1.4 MG/DL (ref 0.7–1.2)
EOSINOPHILS ABSOLUTE: 0.31 K/UL (ref 0.05–0.5)
EOSINOPHILS RELATIVE PERCENT: 3 % (ref 0–6)
GFR, ESTIMATED: 55 ML/MIN/1.73M2
GLUCOSE BLD-MCNC: 88 MG/DL (ref 74–99)
HCT VFR BLD CALC: 48.8 % (ref 37–54)
HDLC SERPL-MCNC: 52 MG/DL
HEMOGLOBIN: 16.3 G/DL (ref 12.5–16.5)
IMMATURE GRANULOCYTES %: 0 % (ref 0–5)
IMMATURE GRANULOCYTES ABSOLUTE: 0.04 K/UL (ref 0–0.58)
LDL CHOLESTEROL: 116 MG/DL
LYMPHOCYTES ABSOLUTE: 2.31 K/UL (ref 1.5–4)
LYMPHOCYTES RELATIVE PERCENT: 24 % (ref 20–42)
MCH RBC QN AUTO: 28.7 PG (ref 26–35)
MCHC RBC AUTO-ENTMCNC: 33.4 G/DL (ref 32–34.5)
MCV RBC AUTO: 85.9 FL (ref 80–99.9)
MONOCYTES ABSOLUTE: 0.75 K/UL (ref 0.1–0.95)
MONOCYTES RELATIVE PERCENT: 8 % (ref 2–12)
NEUTROPHILS ABSOLUTE: 6.32 K/UL (ref 1.8–7.3)
NEUTROPHILS RELATIVE PERCENT: 65 % (ref 43–80)
PDW BLD-RTO: 13.4 % (ref 11.5–15)
PLATELET # BLD: 235 K/UL (ref 130–450)
PMV BLD AUTO: 10.7 FL (ref 7–12)
POTASSIUM SERPL-SCNC: 4.6 MMOL/L (ref 3.5–5)
PROSTATE SPECIFIC ANTIGEN: 0.72 NG/ML (ref 0–4)
RBC # BLD: 5.68 M/UL (ref 3.8–5.8)
SODIUM BLD-SCNC: 141 MMOL/L (ref 132–146)
TOTAL PROTEIN: 7.1 G/DL (ref 6.4–8.3)
TRIGL SERPL-MCNC: 162 MG/DL
TSH SERPL DL<=0.05 MIU/L-ACNC: 3.69 UIU/ML (ref 0.27–4.2)
VLDLC SERPL CALC-MCNC: 32 MG/DL
WBC # BLD: 9.8 K/UL (ref 4.5–11.5)

## 2025-01-13 PROCEDURE — 1036F TOBACCO NON-USER: CPT | Performed by: FAMILY MEDICINE

## 2025-01-13 PROCEDURE — 3017F COLORECTAL CA SCREEN DOC REV: CPT | Performed by: FAMILY MEDICINE

## 2025-01-13 PROCEDURE — G8427 DOCREV CUR MEDS BY ELIG CLIN: HCPCS | Performed by: FAMILY MEDICINE

## 2025-01-13 PROCEDURE — 3077F SYST BP >= 140 MM HG: CPT | Performed by: FAMILY MEDICINE

## 2025-01-13 PROCEDURE — 1159F MED LIST DOCD IN RCRD: CPT | Performed by: FAMILY MEDICINE

## 2025-01-13 PROCEDURE — G8419 CALC BMI OUT NRM PARAM NOF/U: HCPCS | Performed by: FAMILY MEDICINE

## 2025-01-13 PROCEDURE — G0438 PPPS, INITIAL VISIT: HCPCS | Performed by: FAMILY MEDICINE

## 2025-01-13 PROCEDURE — 90471 IMMUNIZATION ADMIN: CPT | Performed by: FAMILY MEDICINE

## 2025-01-13 PROCEDURE — 99214 OFFICE O/P EST MOD 30 MIN: CPT | Performed by: FAMILY MEDICINE

## 2025-01-13 PROCEDURE — 1123F ACP DISCUSS/DSCN MKR DOCD: CPT | Performed by: FAMILY MEDICINE

## 2025-01-13 PROCEDURE — 1160F RVW MEDS BY RX/DR IN RCRD: CPT | Performed by: FAMILY MEDICINE

## 2025-01-13 PROCEDURE — 3080F DIAST BP >= 90 MM HG: CPT | Performed by: FAMILY MEDICINE

## 2025-01-13 PROCEDURE — G0009 ADMIN PNEUMOCOCCAL VACCINE: HCPCS | Performed by: FAMILY MEDICINE

## 2025-01-13 PROCEDURE — 90732 PPSV23 VACC 2 YRS+ SUBQ/IM: CPT | Performed by: FAMILY MEDICINE

## 2025-01-13 SDOH — ECONOMIC STABILITY: FOOD INSECURITY: WITHIN THE PAST 12 MONTHS, THE FOOD YOU BOUGHT JUST DIDN'T LAST AND YOU DIDN'T HAVE MONEY TO GET MORE.: NEVER TRUE

## 2025-01-13 SDOH — ECONOMIC STABILITY: FOOD INSECURITY: WITHIN THE PAST 12 MONTHS, YOU WORRIED THAT YOUR FOOD WOULD RUN OUT BEFORE YOU GOT MONEY TO BUY MORE.: NEVER TRUE

## 2025-01-13 ASSESSMENT — ENCOUNTER SYMPTOMS
CONSTIPATION: 0
VOMITING: 0
RHINORRHEA: 1
PHOTOPHOBIA: 0
COLOR CHANGE: 0
DIARRHEA: 0
SINUS PAIN: 0
COUGH: 0
WHEEZING: 0
BLOOD IN STOOL: 0
EYE REDNESS: 0
SHORTNESS OF BREATH: 1
TROUBLE SWALLOWING: 0
BACK PAIN: 0
ABDOMINAL PAIN: 0
SORE THROAT: 0

## 2025-01-13 ASSESSMENT — LIFESTYLE VARIABLES
HOW OFTEN DURING THE LAST YEAR HAVE YOU NEEDED AN ALCOHOLIC DRINK FIRST THING IN THE MORNING TO GET YOURSELF GOING AFTER A NIGHT OF HEAVY DRINKING: NEVER
HOW OFTEN DURING THE LAST YEAR HAVE YOU FAILED TO DO WHAT WAS NORMALLY EXPECTED FROM YOU BECAUSE OF DRINKING: NEVER
HOW OFTEN DURING THE LAST YEAR HAVE YOU HAD A FEELING OF GUILT OR REMORSE AFTER DRINKING: NEVER
HOW MANY STANDARD DRINKS CONTAINING ALCOHOL DO YOU HAVE ON A TYPICAL DAY: 3 OR 4
HAVE YOU OR SOMEONE ELSE BEEN INJURED AS A RESULT OF YOUR DRINKING: NO
HOW OFTEN DURING THE LAST YEAR HAVE YOU BEEN UNABLE TO REMEMBER WHAT HAPPENED THE NIGHT BEFORE BECAUSE YOU HAD BEEN DRINKING: NEVER
HOW OFTEN DURING THE LAST YEAR HAVE YOU FOUND THAT YOU WERE NOT ABLE TO STOP DRINKING ONCE YOU HAD STARTED: NEVER
HOW OFTEN DO YOU HAVE A DRINK CONTAINING ALCOHOL: 2-4 TIMES A MONTH
HAS A RELATIVE, FRIEND, DOCTOR, OR ANOTHER HEALTH PROFESSIONAL EXPRESSED CONCERN ABOUT YOUR DRINKING OR SUGGESTED YOU CUT DOWN: NO

## 2025-01-13 ASSESSMENT — PATIENT HEALTH QUESTIONNAIRE - PHQ9
SUM OF ALL RESPONSES TO PHQ QUESTIONS 1-9: 0
2. FEELING DOWN, DEPRESSED OR HOPELESS: NOT AT ALL
SUM OF ALL RESPONSES TO PHQ QUESTIONS 1-9: 0
1. LITTLE INTEREST OR PLEASURE IN DOING THINGS: NOT AT ALL
SUM OF ALL RESPONSES TO PHQ9 QUESTIONS 1 & 2: 0

## 2025-01-13 NOTE — PATIENT INSTRUCTIONS

## 2025-01-13 NOTE — PROGRESS NOTES
OFFICE NOTE    25  Name: Flaco Bedolla  :1955   Sex:male   Age:69 y.o.      SUBJECTIVE  Chief Complaint   Patient presents with    Medicare AWV    Shortness of Breath       HPI pt comes in to re establish. Last seen by me in 2019 and by Cardiology 2 years ago. Has been out of meds for couple of mos. Occasionally a little SOB, otherwise assymptomatic    Review of Systems   Constitutional:  Negative for activity change, appetite change, fever and unexpected weight change.        Retired    HENT:  Positive for congestion and rhinorrhea. Negative for ear pain, hearing loss, sinus pain, sore throat and trouble swallowing.    Eyes:  Negative for photophobia, redness and visual disturbance.   Respiratory:  Positive for shortness of breath. Negative for cough and wheezing.    Cardiovascular:  Negative for chest pain, palpitations and leg swelling.   Gastrointestinal:  Negative for abdominal pain, blood in stool, constipation, diarrhea and vomiting.   Endocrine: Negative for cold intolerance, polydipsia and polyuria.   Genitourinary:  Positive for frequency and urgency. Negative for difficulty urinating, dysuria, genital sores and hematuria.   Musculoskeletal:  Positive for arthralgias. Negative for back pain and joint swelling.   Skin:  Negative for color change, pallor and rash.   Allergic/Immunologic: Negative for food allergies.   Neurological:  Negative for dizziness, tremors, seizures, syncope, weakness, numbness and headaches.   Hematological:  Negative for adenopathy. Does not bruise/bleed easily.   Psychiatric/Behavioral:  Negative for agitation, confusion, dysphoric mood, hallucinations and sleep disturbance.         Had episode of Paranoia year or so ago. Never explained. He says he was listening to too much talk radio, does use marijuana some            Current Outpatient Medications:     lisinopril (PRINIVIL;ZESTRIL) 5 MG tablet, Take 1 tablet by mouth daily (Patient not taking:

## 2025-01-20 ENCOUNTER — TELEPHONE (OUTPATIENT)
Dept: FAMILY MEDICINE CLINIC | Age: 70
End: 2025-01-20

## 2025-01-20 RX ORDER — LISINOPRIL 5 MG/1
5 TABLET ORAL DAILY
Qty: 30 TABLET | Refills: 5 | Status: SHIPPED | OUTPATIENT
Start: 2025-01-20

## 2025-01-20 RX ORDER — METOPROLOL SUCCINATE 50 MG/1
50 TABLET, EXTENDED RELEASE ORAL DAILY
Qty: 30 TABLET | Refills: 5 | Status: SHIPPED | OUTPATIENT
Start: 2025-01-20

## 2025-01-20 NOTE — TELEPHONE ENCOUNTER
Prescription sent. I misunderstoold, thought he had these meds at home and just wasn't taking them.

## 2025-01-20 NOTE — TELEPHONE ENCOUNTER
Patient called in the office in regards to his blood pressure. Patient stated that when he was in the office Dr. Vazquez informed him that he was going to send in a blood pressure, but no medication was sent in.   Please advise.    Please send to [facility] in [place].

## 2025-01-21 NOTE — TELEPHONE ENCOUNTER
Advised Flaco that Dr Vazquez sent new scripts for his blood pressure meds to [facility].    He will pick these up later today.